# Patient Record
Sex: FEMALE | Race: WHITE | HISPANIC OR LATINO | Employment: UNEMPLOYED | URBAN - METROPOLITAN AREA
[De-identification: names, ages, dates, MRNs, and addresses within clinical notes are randomized per-mention and may not be internally consistent; named-entity substitution may affect disease eponyms.]

---

## 2017-02-14 RX ORDER — OLOPATADINE HYDROCHLORIDE 1 MG/ML
SOLUTION/ DROPS OPHTHALMIC
Qty: 20 ML | Refills: 7 | Status: SHIPPED | OUTPATIENT
Start: 2017-02-14 | End: 2017-04-17 | Stop reason: SDUPTHER

## 2017-02-28 ENCOUNTER — HOSPITAL ENCOUNTER (EMERGENCY)
Facility: HOSPITAL | Age: 47
Discharge: HOME | End: 2017-03-01
Attending: EMERGENCY MEDICINE
Payer: OTHER GOVERNMENT

## 2017-02-28 DIAGNOSIS — R00.0 TACHYCARDIA, UNSPECIFIED: ICD-10-CM

## 2017-02-28 DIAGNOSIS — R07.9 CHEST PAIN: ICD-10-CM

## 2017-02-28 DIAGNOSIS — J18.9 PNEUMONIA OF RIGHT MIDDLE LOBE DUE TO INFECTIOUS ORGANISM: Primary | ICD-10-CM

## 2017-02-28 LAB
ALBUMIN SERPL BCP-MCNC: 3.5 G/DL
ALP SERPL-CCNC: 63 U/L
ALT SERPL W/O P-5'-P-CCNC: 9 U/L
ANION GAP SERPL CALC-SCNC: 10 MMOL/L
AST SERPL-CCNC: 20 U/L
B-HCG UR QL: NEGATIVE
BACTERIA #/AREA URNS HPF: NORMAL /HPF
BASOPHILS # BLD AUTO: 0.01 K/UL
BASOPHILS NFR BLD: 0.1 %
BILIRUB SERPL-MCNC: 0.9 MG/DL
BILIRUB UR QL STRIP: NEGATIVE
BUN SERPL-MCNC: 8 MG/DL
CALCIUM SERPL-MCNC: 9.1 MG/DL
CHLORIDE SERPL-SCNC: 104 MMOL/L
CLARITY UR: CLEAR
CO2 SERPL-SCNC: 22 MMOL/L
COLOR UR: ABNORMAL
CREAT SERPL-MCNC: 0.8 MG/DL
CTP QC/QA: YES
DIFFERENTIAL METHOD: ABNORMAL
EOSINOPHIL # BLD AUTO: 0 K/UL
EOSINOPHIL NFR BLD: 0.1 %
ERYTHROCYTE [DISTWIDTH] IN BLOOD BY AUTOMATED COUNT: 12.3 %
EST. GFR  (AFRICAN AMERICAN): >60 ML/MIN/1.73 M^2
EST. GFR  (NON AFRICAN AMERICAN): >60 ML/MIN/1.73 M^2
FLUAV AG SPEC QL IA: NEGATIVE
FLUBV AG SPEC QL IA: NEGATIVE
GLUCOSE SERPL-MCNC: 100 MG/DL
GLUCOSE UR QL STRIP: NEGATIVE
HCT VFR BLD AUTO: 39.2 %
HGB BLD-MCNC: 13.4 G/DL
HGB UR QL STRIP: ABNORMAL
KETONES UR QL STRIP: ABNORMAL
LEUKOCYTE ESTERASE UR QL STRIP: NEGATIVE
LYMPHOCYTES # BLD AUTO: 0.8 K/UL
LYMPHOCYTES NFR BLD: 6.8 %
MCH RBC QN AUTO: 29.8 PG
MCHC RBC AUTO-ENTMCNC: 34.2 %
MCV RBC AUTO: 87 FL
MICROSCOPIC COMMENT: NORMAL
MONOCYTES # BLD AUTO: 0.9 K/UL
MONOCYTES NFR BLD: 7.2 %
NEUTROPHILS # BLD AUTO: 10.6 K/UL
NEUTROPHILS NFR BLD: 85.8 %
NITRITE UR QL STRIP: NEGATIVE
PH UR STRIP: 6 [PH] (ref 5–8)
PLATELET # BLD AUTO: 205 K/UL
PMV BLD AUTO: 9.9 FL
POTASSIUM SERPL-SCNC: 3.7 MMOL/L
PROT SERPL-MCNC: 8.8 G/DL
PROT UR QL STRIP: NEGATIVE
RBC # BLD AUTO: 4.5 M/UL
RBC #/AREA URNS HPF: 1 /HPF (ref 0–4)
SODIUM SERPL-SCNC: 136 MMOL/L
SP GR UR STRIP: 1 (ref 1–1.03)
SPECIMEN SOURCE: NORMAL
URN SPEC COLLECT METH UR: ABNORMAL
UROBILINOGEN UR STRIP-ACNC: NEGATIVE EU/DL
WBC # BLD AUTO: 12.37 K/UL

## 2017-02-28 PROCEDURE — 80053 COMPREHEN METABOLIC PANEL: CPT

## 2017-02-28 PROCEDURE — 87400 INFLUENZA A/B EACH AG IA: CPT | Mod: 59

## 2017-02-28 PROCEDURE — 25000003 PHARM REV CODE 250: Performed by: EMERGENCY MEDICINE

## 2017-02-28 PROCEDURE — 99284 EMERGENCY DEPT VISIT MOD MDM: CPT | Mod: 25

## 2017-02-28 PROCEDURE — 96360 HYDRATION IV INFUSION INIT: CPT | Mod: 59

## 2017-02-28 PROCEDURE — 93005 ELECTROCARDIOGRAM TRACING: CPT

## 2017-02-28 PROCEDURE — 81000 URINALYSIS NONAUTO W/SCOPE: CPT

## 2017-02-28 PROCEDURE — 81025 URINE PREGNANCY TEST: CPT | Performed by: EMERGENCY MEDICINE

## 2017-02-28 PROCEDURE — 85025 COMPLETE CBC W/AUTO DIFF WBC: CPT

## 2017-02-28 PROCEDURE — 96361 HYDRATE IV INFUSION ADD-ON: CPT

## 2017-02-28 PROCEDURE — 84484 ASSAY OF TROPONIN QUANT: CPT

## 2017-02-28 RX ORDER — ACETAMINOPHEN 325 MG/1
650 TABLET ORAL
Status: COMPLETED | OUTPATIENT
Start: 2017-02-28 | End: 2017-02-28

## 2017-02-28 RX ADMIN — SODIUM CHLORIDE 1000 ML: 0.9 INJECTION, SOLUTION INTRAVENOUS at 10:02

## 2017-02-28 RX ADMIN — ACETAMINOPHEN 650 MG: 325 TABLET ORAL at 10:02

## 2017-02-28 RX ADMIN — IOHEXOL 70 ML: 350 INJECTION, SOLUTION INTRAVENOUS at 11:02

## 2017-02-28 NOTE — ED AVS SNAPSHOT
[facility]  [address] [place] [address]               Silvia Newsome   2017 10:10 PM   ED    Description:  Female : 1970   Department:  [facility]           Your Care was Coordinated By:     Provider Role From To    Veronica Murray MD Attending Provider 17 2222 --      Reason for Visit     Chest Pain           Diagnoses this Visit        Comments    Pneumonia of right middle lobe due to infectious organism    -  Primary     Chest pain         Tachycardia, unspecified           ED Disposition     ED Disposition Condition Comment    Discharge             To Do List           Follow-up Information     Follow up with Chun Mcdonald MD. Schedule an appointment as soon as possible for a visit in 2 days.    Specialty:  Allergy    Contact information:    Nidia KOV[address]  [facility] [address]  [phone]         These Medications        Disp Refills Start End    azithromycin (ZITHROMAX Z-CATIE) 250 MG tablet 10 tablet 0 3/1/2017 3/11/2017    Take 1 tablet (250 mg total) by mouth once daily. - Oral    Pharmacy: [facility] #[id number] - [place] Chavez - [address].  #: [phone]         [facility]     [facility] -  Assistance  Registered nurses in the [facility] provide clinical advisement, health education, appointment booking, and other advisory services.  Call for this free service at [phone].             Medications           Message regarding Medications     Verify the changes and/or additions to your medication regime listed below are the same as discussed with your clinician today.  If any of these changes or additions are incorrect, please notify your healthcare provider.        START taking these NEW medications        Refills    azithromycin (ZITHROMAX Z-CATIE) 250 MG tablet 0    Sig: Take 1 tablet (250 mg total) by mouth once daily.    Class: Print    Route: Oral       These medications were administered today        Dose Freq    sodium chloride 0.9% bolus 1,000 mL 1,000 mL ED 1 Time    Sig: Inject 1,000 mLs into the vein ED 1 Time.    Class: Normal    Route: Intravenous    acetaminophen tablet 650 mg 650 mg ED 1 Time    Sig: Take 2 tablets (650 mg total) by mouth ED 1 Time.    Class: Normal    Route: Oral    omnipaque 350 iohexol 70 mL 70 mL IMG once as needed    Starting on: 3/1/2017    Sig: Inject 70 mLs into the vein ONCE PRN for contrast.    Class: Normal    Route: Intravenous    sodium chloride 0.9% bolus 1,000 mL 1,000 mL ED 1 Time    Starting on: 3/1/2017    Sig: Inject 1,000 mLs into the vein ED 1 Time.    Class: Normal    Route: Intravenous    azithromycin tablet 500 mg 500 mg ED 1 Time    Sig: Take 2 tablets (500 mg total) by mouth ED 1 Time.    Class: Normal    Route: Oral           Verify that the below list of medications is an accurate representation of the medications you are currently taking.  If none reported, the list may be blank. If incorrect, please contact your healthcare provider. Carry this list with you in case of emergency.           Current Medications     augmented betamethasone dipropionate (DIPROLENE-AF) 0.05 % ointment Apply topically 2 (two) times daily.    azithromycin (ZITHROMAX Z-CATIE) 250 MG tablet Take 1 tablet (250 mg total) by mouth once daily.    azithromycin tablet 500 mg Take 2 tablets (500 mg total) by mouth ED 1 Time.    diphenhydrAMINE (SOMINEX) 25 mg tablet Take 25 mg by mouth nightly as needed for Insomnia.    fluticasone (FLONASE) 50 mcg/actuation nasal spray 2 sprays by Each Nare route once daily.    olopatadine (PATANOL) 0.1 % ophthalmic solution INSTILL 1 DROP IN BOTH EYES TWICE DAILY AS NEEDED           Clinical Reference Information           Your Vitals Were     BP                   96/59           Allergies as of 3/1/2017        Reactions    Shellfish Containing Products       Immunizations Administered on Date of  Encounter - 3/1/2017     None      ED Micro, Lab, POCT     Start Ordered       Status Ordering Provider    03/01/17 0026 03/01/17 0025  Troponin I  STAT      In process     02/28/17 2322 02/28/17 2321  Troponin I  Add-on      Completed     02/28/17 2231 02/28/17 2230  Urinalysis  STAT      Final result     02/28/17 2230 02/28/17 2230  Urinalysis Microscopic  Once      Final result     02/28/17 2224 02/28/17 2223  Influenza antigen Nasopharyngeal Swab  STAT      Final result     02/28/17 2224 02/28/17 2223  POCT urine pregnancy  Once      Final result     02/28/17 2223 02/28/17 2223  CBC auto differential  STAT      Final result     02/28/17 2223 02/28/17 2223  Comprehensive metabolic panel  STAT      Final result     02/28/17 2223 02/28/17 2223  Troponin I  Once      Final result       ED Imaging Orders     Start Ordered       Status Ordering Provider    02/28/17 2332 02/28/17 2331  CTA Chest Non-Coronary - PE Study  1 time imaging      Final result     02/28/17 2224 02/28/17 2223  X-Ray Chest 1 View  1 time imaging      Final result         Discharge Instructions           Pneumonia (Adult)  Pneumonia is an infection deep within the lungs. It is in the small air sacs (alveoli). Pneumonia may be caused by a virus or bacteria. Pneumonia caused by bacteria is usually treated with an antibiotic. Severe cases may need to be treated in the hospital. Milder cases can be treated at home. Symptoms usually start to get better during the first 2 days of treatment.    Home care  Follow these guidelines when caring for yourself at home:  · Rest at home for the first 2 to 3 days, or until you feel stronger. Dont let yourself get overly tired when you go back to your activities.  · Stay away from cigarette smoke - yours or other peoples.  · You may use acetaminophen or ibuprofen to control fever or pain, unless another medicine was prescribed. If you have chronic liver or kidney disease, talk with your health care provider  before using these medicines. Also talk with your provider if youve had a stomach ulcer or GI bleeding. Dont give aspirin to anyone younger than 18 years of age who is ill with a fever. It may cause severe liver damage.  · Your appetite may be poor, so a light diet is fine.  · Drink 6 to 8 glasses of fluids every day to make sure you are getting enough fluids. Beverages can include water, sport drinks, sodas without caffeine, juices, tea, or soup. Fluids will help loosen secretions in the lung. This will make it easier for you to cough up the phlegm (sputum). If you also have heart or kidney disease, check with your health care provider before you drink extra fluids.  · Take antibiotic medicine prescribed until it is all gone, even if you are feeling better after a few days.  Follow-up care  Follow up with your health care provider in the next 2 to 3 days, or as advised. This is to be sure the medicine is helping you get better.  If you are 65 or older, you should get a pneumococcal vaccine and a yearly flu (influenza) shot. You should also get these vaccines if you have chronic lung disease like asthma, emphysema, or COPD. Ask your provider about this.  When to seek medical advice  Call your health care provider right away if any of these occur:  · You dont get better within the first 48 hours of treatment  · Shortness of breath gets worse  · Rapid breathing (more than 25 breaths per minute)  · Coughing up blood  · Chest pain gets worse with breathing  · Fever of 102°F (38°C) or higher that doesnt get better with fever medicine  · Weakness, dizziness, or fainting that gets worse  · Thirst or dry mouth that gets worse  · Sinus pain, headache, or a stiff neck  · Chest pain not caused by coughing  Date Last Reviewed: 12/23/2014  © 0759-5665 [employer]. [address] [place] [address], [place] [address]. All rights reserved. This information is not intended as a substitute for professional medical care.  Always follow your healthcare professional's instructions.      Your CT Scan showed an incidental findings of a vascular abnormality to the left lung. This is not currently an emergency and not related to your symptoms but should be followed by your Primary provider or Cardiothoracic Surgeon.      [facility] complies with applicable Federal civil rights laws and does not discriminate on the basis of race, color, national origin, age, disability, or sex.        Language Assistance Services     ATTENTION: Language assistance services are available, free of charge. Please call [phone].      ATENCIÓN: Si habla español, tiene a sanchez disposición servicios gratuitos de asistencia lingüística. Llame al [phone].     VALERIA Ý: N?u b?n nói Ti?ng Vi?t, có các d?ch v? h? tr? ngôn ng? mi?n phí dành cho b?n. G?i s? [phone].

## 2017-03-01 VITALS
DIASTOLIC BLOOD PRESSURE: 74 MMHG | BODY MASS INDEX: 19.49 KG/M2 | RESPIRATION RATE: 18 BRPM | TEMPERATURE: 98 F | WEIGHT: 110 LBS | SYSTOLIC BLOOD PRESSURE: 112 MMHG | HEIGHT: 63 IN | OXYGEN SATURATION: 100 % | HEART RATE: 94 BPM

## 2017-03-01 LAB — TROPONIN I SERPL DL<=0.01 NG/ML-MCNC: <0.006 NG/ML

## 2017-03-01 PROCEDURE — 25500020 PHARM REV CODE 255: Performed by: EMERGENCY MEDICINE

## 2017-03-01 PROCEDURE — 25000003 PHARM REV CODE 250: Performed by: EMERGENCY MEDICINE

## 2017-03-01 RX ORDER — AZITHROMYCIN 250 MG/1
500 TABLET, FILM COATED ORAL
Status: COMPLETED | OUTPATIENT
Start: 2017-03-01 | End: 2017-03-01

## 2017-03-01 RX ORDER — AZITHROMYCIN 250 MG/1
250 TABLET, FILM COATED ORAL DAILY
Qty: 10 TABLET | Refills: 0 | Status: SHIPPED | OUTPATIENT
Start: 2017-03-01 | End: 2017-03-11

## 2017-03-01 RX ADMIN — SODIUM CHLORIDE 1000 ML: 0.9 INJECTION, SOLUTION INTRAVENOUS at 12:03

## 2017-03-01 RX ADMIN — AZITHROMYCIN 500 MG: 250 TABLET, FILM COATED ORAL at 01:03

## 2017-03-01 NOTE — DISCHARGE INSTRUCTIONS
Pneumonia (Adult)  Pneumonia is an infection deep within the lungs. It is in the small air sacs (alveoli). Pneumonia may be caused by a virus or bacteria. Pneumonia caused by bacteria is usually treated with an antibiotic. Severe cases may need to be treated in the hospital. Milder cases can be treated at home. Symptoms usually start to get better during the first 2 days of treatment.    Home care  Follow these guidelines when caring for yourself at home:  · Rest at home for the first 2 to 3 days, or until you feel stronger. Dont let yourself get overly tired when you go back to your activities.  · Stay away from cigarette smoke - yours or other peoples.  · You may use acetaminophen or ibuprofen to control fever or pain, unless another medicine was prescribed. If you have chronic liver or kidney disease, talk with your health care provider before using these medicines. Also talk with your provider if youve had a stomach ulcer or GI bleeding. Dont give aspirin to anyone younger than 18 years of age who is ill with a fever. It may cause severe liver damage.  · Your appetite may be poor, so a light diet is fine.  · Drink 6 to 8 glasses of fluids every day to make sure you are getting enough fluids. Beverages can include water, sport drinks, sodas without caffeine, juices, tea, or soup. Fluids will help loosen secretions in the lung. This will make it easier for you to cough up the phlegm (sputum). If you also have heart or kidney disease, check with your health care provider before you drink extra fluids.  · Take antibiotic medicine prescribed until it is all gone, even if you are feeling better after a few days.  Follow-up care  Follow up with your health care provider in the next 2 to 3 days, or as advised. This is to be sure the medicine is helping you get better.  If you are 65 or older, you should get a pneumococcal vaccine and a yearly flu (influenza) shot. You should also get these vaccines if you have  chronic lung disease like asthma, emphysema, or COPD. Ask your provider about this.  When to seek medical advice  Call your health care provider right away if any of these occur:  · You dont get better within the first 48 hours of treatment  · Shortness of breath gets worse  · Rapid breathing (more than 25 breaths per minute)  · Coughing up blood  · Chest pain gets worse with breathing  · Fever of 102°F (38°C) or higher that doesnt get better with fever medicine  · Weakness, dizziness, or fainting that gets worse  · Thirst or dry mouth that gets worse  · Sinus pain, headache, or a stiff neck  · Chest pain not caused by coughing  Date Last Reviewed: 12/23/2014  © 8693-6098 "[employer]. [address] [place] [address], [place] [address]. All rights reserved. This information is not intended as a substitute for professional medical care. Always follow your healthcare professional's instructions.      Your CT Scan showed an incidental findings of a vascular abnormality to the left lung. This is not currently an emergency and not related to your symptoms but should be followed by your Primary provider or Cardiothoracic Surgeon.

## 2017-03-01 NOTE — ED PROVIDER NOTES
"Encounter Date: 2/28/2017    SCRIBE #1 NOTE: I, Margarita Hsieh, am scribing for, and in the presence of,  Veronica Murray MD. I have scribed the following portions of the note - Other sections scribed: HPI, ROS.       History     Chief Complaint   Patient presents with    Chest Pain     " She is having pain in her right chest and right shoulder that hurts when she inhales Today she was running a fever of 102.5 less than an hour ago. +n/v      Review of patient's allergies indicates:   Allergen Reactions    Shellfish containing products      HPI Comments: CC: Chest Pain    HPI: 46 year old female with no known PMHx presents to the ED c/o acute, severe (8/10) right sided chest pain which radiates to her right shoulder since this morning. Patient states associated intermittent fever, sore throat, nausea, and decreased appetite.  reports treating with 2 Advil this morning, 2 Excedrin this morning, and Benadryl at 1900. Patient notes she did not receive the flu shot this year. No sick contact. Patient otherwise denies a hx of blood clots, dysuria, back pain and other symptoms.          The history is provided by the patient and the spouse. No  was used.     History reviewed. No pertinent past medical history.  History reviewed. No pertinent surgical history.  Family History   Problem Relation Age of Onset    Breast cancer Neg Hx     Colon cancer Neg Hx     Ovarian cancer Neg Hx      Social History   Substance Use Topics    Smoking status: Never Smoker    Smokeless tobacco: None    Alcohol use No     Review of Systems   Constitutional: Positive for appetite change (decreased) and fever.   HENT: Positive for sore throat. Negative for rhinorrhea.    Eyes: Negative for pain.   Respiratory: Negative for shortness of breath.    Cardiovascular: Positive for chest pain (right sided).   Gastrointestinal: Positive for nausea.   Genitourinary: Negative for dysuria.   Musculoskeletal: Negative " for back pain.        (+) Shoulder Pain (right)   Skin: Negative for rash.   Neurological: Negative for headaches.       Physical Exam   Initial Vitals   BP Pulse Resp Temp SpO2   02/28/17 2208 02/28/17 2208 02/28/17 2208 02/28/17 2208 02/28/17 2208   112/81 126 20 100.1 °F (37.8 °C) 99 %     Physical Exam    Nursing note and vitals reviewed.  Constitutional: She appears well-developed and well-nourished. She is cooperative.  Non-toxic appearance. No distress.   HENT:   Head: Normocephalic and atraumatic.   Mouth/Throat: Oropharynx is clear and moist.   Eyes: Conjunctivae and EOM are normal. Pupils are equal, round, and reactive to light.   Neck: Normal range of motion and full passive range of motion without pain. Neck supple. No thyromegaly present. No JVD present.   Cardiovascular: Regular rhythm, normal heart sounds and normal pulses. Tachycardia present.    Pulmonary/Chest: Effort normal and breath sounds normal. No respiratory distress.   Abdominal: Soft. Normal appearance and bowel sounds are normal. She exhibits no distension and no mass. There is no tenderness. There is no CVA tenderness.   Musculoskeletal: Normal range of motion.   Neurological: She is alert and oriented to person, place, and time. She has normal strength. No cranial nerve deficit or sensory deficit.   Skin: Skin is warm, dry and intact. No rash noted.   Psychiatric: She has a normal mood and affect. Her speech is normal and behavior is normal. Judgment and thought content normal.         ED Course   Procedures  Labs Reviewed   CBC W/ AUTO DIFFERENTIAL - Abnormal; Notable for the following:        Result Value    Gran # 10.6 (*)     Lymph # 0.8 (*)     Gran% 85.8 (*)     Lymph% 6.8 (*)     All other components within normal limits   COMPREHENSIVE METABOLIC PANEL - Abnormal; Notable for the following:     CO2 22 (*)     Total Protein 8.8 (*)     ALT 9 (*)     All other components within normal limits   URINALYSIS - Abnormal; Notable for the  following:     Ketones, UA Trace (*)     Occult Blood UA 1+ (*)     All other components within normal limits   INFLUENZA A AND B ANTIGEN   URINALYSIS MICROSCOPIC   TROPONIN I   TROPONIN I   TROPONIN I   POCT URINE PREGNANCY     EKG Readings: (Independently Interpreted)   Initial Reading: No STEMI.   Sinus tachycardia, rate 115, right axis deviation, there able baseline, Q waves V1 and V2, T-wave inversion to 3 aVF, V3, V4 V5, no priors for comparison       X-Rays:   Independently Interpreted Readings:   Chest X-Ray: Normal heart size. Possible right-sided infiltrate, no pneumothorax, no cardiomegaly     Medical Decision Making:   Initial Assessment:   Urgent evaluation a 38-year-old female presenting with 24 hours of intermittent fever, right-sided chest and back discomfort, chills, and sore throat.  Patient endorses decreased appetite, no relief with Advil, Excedrin, or Benadryl.  Patient did not receive influenza vaccine this year.  Patient denies history of DVT or PE, no immobilization, or extremity swelling.  On exam patient has low-grade temperature, tachycardic, not tachypneic, no hypoxia.  I suspect viral syndrome, influenza, pneumonia, less likely pericarditis, low suspicion for PE.  We'll administer antipyretics, IV fluids, and reassess.  Clinical Tests:   Lab Tests: Ordered and Reviewed  Radiological Study: Ordered and Reviewed  Medical Tests: Ordered and Reviewed  ED Management:  Patient's tachycardia improved, however questionable haziness to the right middle lobe, we'll obtain a CTA rule out PE or interstitial disease.  Patient not pregnant, no anemia    1:02 AM  Discussed with the patient findings of right middle lobe pneumonia as well as incidental findings for sided arteriovenous malformation and need for follow-up.  Patient given 1 dose antibiotics prior to discharge home, tachycardia improved after 2 L IV fluid bolus.  Discharge home with fever precautions and strict follow-up with primary care  provider within 24-48 hours or return to the emergency Department with worsening shortness of breath or any other medical concerns.            Scribe Attestation:   Scribe #1: I performed the above scribed service and the documentation accurately describes the services I performed. I attest to the accuracy of the note.    Attending Attestation:           Physician Attestation for Scribe:  Physician Attestation Statement for Scribe #1: I, Veronica Murray MD, reviewed documentation, as scribed by Margarita Hsieh in my presence, and it is both accurate and complete.                 ED Course     Clinical Impression:       Disposition:   Disposition: Discharged  Condition: Fair     1. Pneumonia of right middle lobe due to infectious organism    2. Chest pain    3. Tachycardia, unspecified           Veronica Murray MD  03/01/17 0104

## 2017-03-01 NOTE — ED TRIAGE NOTES
Pt. Presents to ED with c/o generalized body aches, fever and cough x a few days, c/o nausea and vomiting rates pain 7/10.

## 2017-04-17 RX ORDER — OLOPATADINE HYDROCHLORIDE 1 MG/ML
SOLUTION/ DROPS OPHTHALMIC
Qty: 5 ML | Refills: 0 | Status: SHIPPED | OUTPATIENT
Start: 2017-04-17 | End: 2019-03-13 | Stop reason: SDUPTHER

## 2017-04-18 NOTE — TELEPHONE ENCOUNTER
----- Message from Bhargavi Doe MA sent at 4/18/2017 11:56 AM CDT -----  Contact: [facility] [phone]  Pharmacy would like to speak with someone regarding a Rx clarification for olopatadine (PATANOL) 0.1 % ophthalmic solution. Please advise.    Thanks

## 2017-04-18 NOTE — TELEPHONE ENCOUNTER
Informed pharmacist that rx is good for only 5 ml. Patient needs follow up appointment for any further refills.

## 2019-03-13 RX ORDER — OLOPATADINE HYDROCHLORIDE 1 MG/ML
SOLUTION/ DROPS OPHTHALMIC
Qty: 5 ML | Refills: 0 | Status: SHIPPED | OUTPATIENT
Start: 2019-03-13 | End: 2019-08-16 | Stop reason: SDUPTHER

## 2019-05-31 RX ORDER — FLUTICASONE PROPIONATE 50 MCG
SPRAY, SUSPENSION (ML) NASAL
Qty: 16 ML | Refills: 0 | OUTPATIENT
Start: 2019-05-31

## 2019-08-16 ENCOUNTER — OFFICE VISIT (OUTPATIENT)
Dept: ALLERGY | Facility: CLINIC | Age: 49
End: 2019-08-16
Payer: OTHER GOVERNMENT

## 2019-08-16 VITALS — HEIGHT: 61 IN | BODY MASS INDEX: 20.82 KG/M2 | WEIGHT: 110.25 LBS

## 2019-08-16 DIAGNOSIS — R09.82 POST-NASAL DRIP: ICD-10-CM

## 2019-08-16 DIAGNOSIS — H10.45 OTHER CHRONIC ALLERGIC CONJUNCTIVITIS OF BOTH EYES: Chronic | ICD-10-CM

## 2019-08-16 DIAGNOSIS — J31.0 RHINITIS, CHRONIC: Primary | ICD-10-CM

## 2019-08-16 PROCEDURE — 99213 OFFICE O/P EST LOW 20 MIN: CPT | Mod: PBBFAC,PO | Performed by: STUDENT IN AN ORGANIZED HEALTH CARE EDUCATION/TRAINING PROGRAM

## 2019-08-16 PROCEDURE — 99204 OFFICE O/P NEW MOD 45 MIN: CPT | Mod: S$PBB,,, | Performed by: STUDENT IN AN ORGANIZED HEALTH CARE EDUCATION/TRAINING PROGRAM

## 2019-08-16 PROCEDURE — 99999 PR PBB SHADOW E&M-EST. PATIENT-LVL III: ICD-10-PCS | Mod: PBBFAC,,, | Performed by: STUDENT IN AN ORGANIZED HEALTH CARE EDUCATION/TRAINING PROGRAM

## 2019-08-16 PROCEDURE — 99999 PR PBB SHADOW E&M-EST. PATIENT-LVL III: CPT | Mod: PBBFAC,,, | Performed by: STUDENT IN AN ORGANIZED HEALTH CARE EDUCATION/TRAINING PROGRAM

## 2019-08-16 PROCEDURE — 99204 PR OFFICE/OUTPT VISIT, NEW, LEVL IV, 45-59 MIN: ICD-10-PCS | Mod: S$PBB,,, | Performed by: STUDENT IN AN ORGANIZED HEALTH CARE EDUCATION/TRAINING PROGRAM

## 2019-08-16 RX ORDER — DIPHENHYDRAMINE HCL 25 MG
25 TABLET ORAL NIGHTLY PRN
COMMUNITY
Start: 2019-08-16

## 2019-08-16 RX ORDER — OLOPATADINE HYDROCHLORIDE 1 MG/ML
SOLUTION/ DROPS OPHTHALMIC
Qty: 15 ML | Refills: 11 | Status: SHIPPED | OUTPATIENT
Start: 2019-08-16 | End: 2020-03-03

## 2019-08-16 RX ORDER — AZELASTINE 1 MG/ML
2 SPRAY, METERED NASAL 2 TIMES DAILY PRN
Qty: 30 ML | Refills: 11 | Status: SHIPPED | OUTPATIENT
Start: 2019-08-16 | End: 2022-01-14 | Stop reason: SDUPTHER

## 2019-08-16 RX ORDER — FLUTICASONE PROPIONATE 50 MCG
2 SPRAY, SUSPENSION (ML) NASAL DAILY
Qty: 1 BOTTLE | Refills: 11 | Status: SHIPPED | OUTPATIENT
Start: 2019-08-16 | End: 2019-09-09 | Stop reason: SDUPTHER

## 2019-08-16 RX ORDER — OLOPATADINE HYDROCHLORIDE 1 MG/ML
SOLUTION/ DROPS OPHTHALMIC
Qty: 5 ML | Refills: 0 | Status: SHIPPED | OUTPATIENT
Start: 2019-08-16 | End: 2019-08-16 | Stop reason: SDUPTHER

## 2019-08-16 NOTE — PROGRESS NOTES
Allergy Clinic Note  [facility]    Subjective:      Patient ID: Silvia Newsome is a 49 y.o. female.    Chief Complaint: Other (red and itchy eyes in Jan and Feb) and Nasal Congestion      Referring Provider: Self, Aaareferral    History of Present Illness:  49-year-old female is here with her  requesting refills of nose spray and eye drops.  Her   dominated the conversation, rarely allowing her to speak.    Chief complaints are itchy eyes and nasal congestion.  Also complains of cough which she is unable to localize.  All symptoms except cough her adequately controlled on Flonase nasal spray and all of padded in eyedrops, according to her .  She is currently out of nose spray and is experiencing an increase in nasal congestion.    Patient was seen in our section by Dr. Bronson Mcdonald in 2014 and evidently by me in private practice prior to 2013.    Reported delayed onset of itching and gentle area after eating large amount of shrimp.        Additional history:  Past medical history is significant for an isolated episode of pneumonia.  No history of surgeries.    Patient Active Problem List   Diagnosis    Pneumonia of right middle lobe due to infectious organism     Current Outpatient Medications on File Prior to Visit   Medication Sig Dispense Refill    [DISCONTINUED] diphenhydrAMINE (SOMINEX) 25 mg tablet Take 25 mg by mouth nightly as needed for Insomnia.      [DISCONTINUED] fluticasone (FLONASE) 50 mcg/actuation nasal spray 2 sprays by Each Nare route once daily. 1 Bottle 11    [DISCONTINUED] olopatadine (PATANOL) 0.1 % ophthalmic solution INSTILL 1 DROP IN BOTH EYES TWICE DAILY AS NEEDED 5 mL 0    augmented betamethasone dipropionate (DIPROLENE-AF) 0.05 % ointment Apply topically 2 (two) times daily. 45 g 2    naproxen (NAPROSYN) 500 MG tablet       [DISCONTINUED] clobetasol 0.05% (TEMOVATE) 0.05 % Oint Use twice daily for one week 30 g 1    [DISCONTINUED] estradiol  "(ESTRACE) 0.01 % (0.1 mg/gram) vaginal cream Place 0.5 g vaginally every other day. 42.5 g 5    [DISCONTINUED] triamcinolone acetonide 0.1% (KENALOG) 0.1 % cream Apply topically 2 (two) times daily as needed. 45 g 1     No current facility-administered medications on file prior to visit.          ROS    Objective:   Ht 5' 1" (1.549 m)   Wt 50 kg (110 lb 3.7 oz)   BMI 20.83 kg/m²     Physical Exam   Constitutional: She is well-developed, well-nourished, and in no distress.   HENT:   Head: Normocephalic and atraumatic.   Nose: Nose normal.   Mouth/Throat: No oropharyngeal exudate.   TMs clear.  Nares pink with moderate turbinates swelling bilaterally.   Eyes: Conjunctivae are normal. Right eye exhibits no discharge. Left eye exhibits no discharge.   Neck: Neck supple.   Cardiovascular: Normal rate, regular rhythm, normal heart sounds and intact distal pulses.   Pulmonary/Chest: Effort normal and breath sounds normal. No stridor. No respiratory distress. She has no wheezes.   Abdominal: She exhibits no distension.   Musculoskeletal: She exhibits no edema or deformity.   Lymphadenopathy:     She has no cervical adenopathy.   Neurological: She is alert. GCS score is 15.   Skin: No rash noted. No erythema.   Psychiatric: Affect normal.   Apt was rushed and limited by 's breaking in when cient was speaking.       Data:   Reviewed Dr. Mcdonald does note from 2014    Previous skin testing results from around 2013 are not available.    Assessment:     1. Rhinitis, chronic    2. Post-nasal drip    3. Chronic allergic conjunctivitis        Plan:     Silvia was seen today for other and nasal congestion.    Diagnoses and all orders for this visit:    Rhinitis, chronic manifest by Post-nasal drip  Chronic allergic conjunctivitis  -     azelastine (ASTELIN) 137 mcg (0.1 %) nasal spray; 2 sprays (274 mcg total) by Nasal route 2 (two) times daily as needed for Rhinitis. Donot snort (because it tastes bad)  -     " fluticasone propionate (FLONASE) 50 mcg/actuation nasal spray; 2 sprays (100 mcg total) by Each Nostril route once daily.  -     olopatadine (PATANOL) 0.1 % ophthalmic solution; INSTILL 1 DROP IN BOTH EYES TWICE DAILY AS NEEDED  -     diphenhydrAMINE (SOMINEX) 25 mg tablet; Take 1 tablet (25 mg total) by mouth nightly as needed for Insomnia.        Patient Instructions       Resume Flonase (= fluticasone) nasal spray:  2 squirts each nostril daily   Remember to aim out toward your ear.   Needs to be used regularly 5-14 days for full effect.    Olopatadine eye drops as needed, up to twice a day      Astelin = azelastine nasal spray if needed for cough.   2 squirts each nostril as needed, up to twice a day   Do not snort (because it burns and tastes bad)    Return as needed              Follow up if symptoms worsen or fail to improve.    Jaja Davidson MD  coord care >50% of visit.

## 2019-08-16 NOTE — PATIENT INSTRUCTIONS
Resume Flonase (= fluticasone) nasal spray:  2 squirts each nostril daily   Remember to aim out toward your ear.   Needs to be used regularly 5-14 days for full effect.    Olopatadine eye drops as needed, up to twice a day      Astelin = azelastine nasal spray if needed for cough.   2 squirts each nostril as needed, up to twice a day   Do not snort (because it burns and tastes bad)    Return as needed

## 2019-09-09 DIAGNOSIS — J31.0 RHINITIS, CHRONIC: Primary | ICD-10-CM

## 2019-09-09 RX ORDER — FLUTICASONE PROPIONATE 50 MCG
2 SPRAY, SUSPENSION (ML) NASAL DAILY
Qty: 1 BOTTLE | Refills: 11 | Status: SHIPPED | OUTPATIENT
Start: 2019-09-09 | End: 2019-09-09 | Stop reason: SDUPTHER

## 2019-09-09 RX ORDER — FLUTICASONE PROPIONATE 50 MCG
2 SPRAY, SUSPENSION (ML) NASAL DAILY
Qty: 1 BOTTLE | Refills: 11 | Status: SHIPPED | OUTPATIENT
Start: 2019-09-09 | End: 2022-01-14 | Stop reason: SDUPTHER

## 2019-10-17 PROBLEM — J30.1 SEASONAL ALLERGIC RHINITIS DUE TO POLLEN: Status: ACTIVE | Noted: 2019-10-17

## 2020-03-01 DIAGNOSIS — H10.45 OTHER CHRONIC ALLERGIC CONJUNCTIVITIS OF BOTH EYES: Chronic | ICD-10-CM

## 2020-03-03 RX ORDER — OLOPATADINE HYDROCHLORIDE 1 MG/ML
SOLUTION/ DROPS OPHTHALMIC
Qty: 5 ML | Refills: 5 | Status: SHIPPED | OUTPATIENT
Start: 2020-03-03 | End: 2022-01-14 | Stop reason: SDUPTHER

## 2021-04-16 ENCOUNTER — PATIENT MESSAGE (OUTPATIENT)
Dept: RESEARCH | Facility: HOSPITAL | Age: 51
End: 2021-04-16

## 2021-09-22 ENCOUNTER — TELEPHONE (OUTPATIENT)
Dept: ALLERGY | Facility: CLINIC | Age: 51
End: 2021-09-22

## 2021-09-22 ENCOUNTER — PATIENT MESSAGE (OUTPATIENT)
Dept: ALLERGY | Facility: CLINIC | Age: 51
End: 2021-09-22

## 2021-10-04 ENCOUNTER — TELEPHONE (OUTPATIENT)
Dept: ALLERGY | Facility: CLINIC | Age: 51
End: 2021-10-04

## 2021-11-08 ENCOUNTER — HOSPITAL ENCOUNTER (EMERGENCY)
Facility: HOSPITAL | Age: 51
Discharge: HOME | End: 2021-11-08
Attending: EMERGENCY MEDICINE
Payer: OTHER GOVERNMENT

## 2021-11-08 VITALS
OXYGEN SATURATION: 99 % | BODY MASS INDEX: 21.6 KG/M2 | TEMPERATURE: 99 F | WEIGHT: 110 LBS | DIASTOLIC BLOOD PRESSURE: 64 MMHG | HEIGHT: 60 IN | HEART RATE: 69 BPM | SYSTOLIC BLOOD PRESSURE: 98 MMHG | RESPIRATION RATE: 23 BRPM

## 2021-11-08 DIAGNOSIS — R07.9 CHEST PAIN: ICD-10-CM

## 2021-11-08 DIAGNOSIS — S29.019A THORACIC MYOFASCIAL STRAIN, INITIAL ENCOUNTER: ICD-10-CM

## 2021-11-08 DIAGNOSIS — R07.89 CHEST PAIN, NON-CARDIAC: Primary | ICD-10-CM

## 2021-11-08 LAB
B-HCG UR QL: NEGATIVE
CTP QC/QA: YES

## 2021-11-08 PROCEDURE — 93005 ELECTROCARDIOGRAM TRACING: CPT

## 2021-11-08 PROCEDURE — 93010 ELECTROCARDIOGRAM REPORT: CPT | Mod: ,,, | Performed by: INTERNAL MEDICINE

## 2021-11-08 PROCEDURE — 63600175 PHARM REV CODE 636 W HCPCS: Performed by: EMERGENCY MEDICINE

## 2021-11-08 PROCEDURE — 81025 URINE PREGNANCY TEST: CPT | Performed by: PHYSICIAN ASSISTANT

## 2021-11-08 PROCEDURE — 93010 EKG 12-LEAD: ICD-10-PCS | Mod: ,,, | Performed by: INTERNAL MEDICINE

## 2021-11-08 PROCEDURE — 99284 EMERGENCY DEPT VISIT MOD MDM: CPT | Mod: 25

## 2021-11-08 RX ORDER — HYDROCODONE BITARTRATE AND ACETAMINOPHEN 5; 325 MG/1; MG/1
1 TABLET ORAL EVERY 4 HOURS PRN
Qty: 12 TABLET | Refills: 0 | Status: SHIPPED | OUTPATIENT
Start: 2021-11-08 | End: 2021-11-18

## 2021-11-08 RX ORDER — PREDNISONE 20 MG/1
60 TABLET ORAL
Status: COMPLETED | OUTPATIENT
Start: 2021-11-08 | End: 2021-11-08

## 2021-11-08 RX ORDER — PREDNISONE 20 MG/1
40 TABLET ORAL DAILY
Qty: 10 TABLET | Refills: 0 | Status: SHIPPED | OUTPATIENT
Start: 2021-11-08 | End: 2021-11-13

## 2021-11-08 RX ADMIN — PREDNISONE 60 MG: 20 TABLET ORAL at 04:11

## 2021-12-14 ENCOUNTER — TELEPHONE (OUTPATIENT)
Dept: ALLERGY | Facility: CLINIC | Age: 51
End: 2021-12-14
Payer: OTHER GOVERNMENT

## 2021-12-14 DIAGNOSIS — J31.0 RHINITIS, CHRONIC: ICD-10-CM

## 2021-12-14 RX ORDER — FLUTICASONE PROPIONATE 50 MCG
2 SPRAY, SUSPENSION (ML) NASAL DAILY
Status: CANCELLED | OUTPATIENT
Start: 2021-12-14

## 2021-12-15 RX ORDER — FLUTICASONE PROPIONATE 50 MCG
2 SPRAY, SUSPENSION (ML) NASAL DAILY
Qty: 1 ML | Refills: 1 | Status: CANCELLED | OUTPATIENT
Start: 2021-12-15

## 2022-01-14 ENCOUNTER — TELEPHONE (OUTPATIENT)
Dept: ALLERGY | Facility: CLINIC | Age: 52
End: 2022-01-14
Payer: OTHER GOVERNMENT

## 2022-01-14 ENCOUNTER — LAB VISIT (OUTPATIENT)
Dept: LAB | Facility: HOSPITAL | Age: 52
End: 2022-01-14
Attending: STUDENT IN AN ORGANIZED HEALTH CARE EDUCATION/TRAINING PROGRAM
Payer: OTHER GOVERNMENT

## 2022-01-14 ENCOUNTER — OFFICE VISIT (OUTPATIENT)
Dept: ALLERGY | Facility: CLINIC | Age: 52
End: 2022-01-14
Payer: OTHER GOVERNMENT

## 2022-01-14 VITALS — BODY MASS INDEX: 26.73 KG/M2 | HEIGHT: 57 IN | WEIGHT: 123.88 LBS

## 2022-01-14 DIAGNOSIS — R59.1 LYMPHADENOPATHY: ICD-10-CM

## 2022-01-14 DIAGNOSIS — J30.9 ALLERGIC RHINITIS, UNSPECIFIED SEASONALITY, UNSPECIFIED TRIGGER: Primary | ICD-10-CM

## 2022-01-14 DIAGNOSIS — H10.45 OTHER CHRONIC ALLERGIC CONJUNCTIVITIS OF BOTH EYES: Chronic | ICD-10-CM

## 2022-01-14 DIAGNOSIS — R09.82 POST-NASAL DRIP: ICD-10-CM

## 2022-01-14 LAB
BASOPHILS # BLD AUTO: 0.02 K/UL (ref 0–0.2)
BASOPHILS NFR BLD: 0.4 % (ref 0–1.9)
DIFFERENTIAL METHOD: NORMAL
EOSINOPHIL # BLD AUTO: 0.2 K/UL (ref 0–0.5)
EOSINOPHIL NFR BLD: 3 % (ref 0–8)
ERYTHROCYTE [DISTWIDTH] IN BLOOD BY AUTOMATED COUNT: 12 % (ref 11.5–14.5)
HCT VFR BLD AUTO: 39.7 % (ref 37–48.5)
HGB BLD-MCNC: 12.9 G/DL (ref 12–16)
IMM GRANULOCYTES # BLD AUTO: 0.01 K/UL (ref 0–0.04)
IMM GRANULOCYTES NFR BLD AUTO: 0.2 % (ref 0–0.5)
LYMPHOCYTES # BLD AUTO: 1.7 K/UL (ref 1–4.8)
LYMPHOCYTES NFR BLD: 33.3 % (ref 18–48)
MCH RBC QN AUTO: 29.1 PG (ref 27–31)
MCHC RBC AUTO-ENTMCNC: 32.5 G/DL (ref 32–36)
MCV RBC AUTO: 89 FL (ref 82–98)
MONOCYTES # BLD AUTO: 0.6 K/UL (ref 0.3–1)
MONOCYTES NFR BLD: 11.1 % (ref 4–15)
NEUTROPHILS # BLD AUTO: 2.6 K/UL (ref 1.8–7.7)
NEUTROPHILS NFR BLD: 52 % (ref 38–73)
NRBC BLD-RTO: 0 /100 WBC
PLATELET # BLD AUTO: 218 K/UL (ref 150–450)
PMV BLD AUTO: 10.9 FL (ref 9.2–12.9)
RBC # BLD AUTO: 4.44 M/UL (ref 4–5.4)
WBC # BLD AUTO: 5.04 K/UL (ref 3.9–12.7)

## 2022-01-14 PROCEDURE — 99213 OFFICE O/P EST LOW 20 MIN: CPT | Mod: PBBFAC,PO | Performed by: STUDENT IN AN ORGANIZED HEALTH CARE EDUCATION/TRAINING PROGRAM

## 2022-01-14 PROCEDURE — 99213 PR OFFICE/OUTPT VISIT, EST, LEVL III, 20-29 MIN: ICD-10-PCS | Mod: S$PBB,,, | Performed by: STUDENT IN AN ORGANIZED HEALTH CARE EDUCATION/TRAINING PROGRAM

## 2022-01-14 PROCEDURE — 36415 COLL VENOUS BLD VENIPUNCTURE: CPT | Mod: PO | Performed by: STUDENT IN AN ORGANIZED HEALTH CARE EDUCATION/TRAINING PROGRAM

## 2022-01-14 PROCEDURE — 99999 PR PBB SHADOW E&M-EST. PATIENT-LVL III: CPT | Mod: PBBFAC,,, | Performed by: STUDENT IN AN ORGANIZED HEALTH CARE EDUCATION/TRAINING PROGRAM

## 2022-01-14 PROCEDURE — 99999 PR PBB SHADOW E&M-EST. PATIENT-LVL III: ICD-10-PCS | Mod: PBBFAC,,, | Performed by: STUDENT IN AN ORGANIZED HEALTH CARE EDUCATION/TRAINING PROGRAM

## 2022-01-14 PROCEDURE — 80053 COMPREHEN METABOLIC PANEL: CPT | Performed by: STUDENT IN AN ORGANIZED HEALTH CARE EDUCATION/TRAINING PROGRAM

## 2022-01-14 PROCEDURE — 99213 OFFICE O/P EST LOW 20 MIN: CPT | Mod: S$PBB,,, | Performed by: STUDENT IN AN ORGANIZED HEALTH CARE EDUCATION/TRAINING PROGRAM

## 2022-01-14 PROCEDURE — 85025 COMPLETE CBC W/AUTO DIFF WBC: CPT | Performed by: STUDENT IN AN ORGANIZED HEALTH CARE EDUCATION/TRAINING PROGRAM

## 2022-01-14 RX ORDER — FLUTICASONE PROPIONATE 50 MCG
2 SPRAY, SUSPENSION (ML) NASAL DAILY
Qty: 16 G | Refills: 11 | Status: SHIPPED | OUTPATIENT
Start: 2022-01-14

## 2022-01-14 RX ORDER — OLOPATADINE HYDROCHLORIDE 1 MG/ML
1 SOLUTION/ DROPS OPHTHALMIC 2 TIMES DAILY PRN
Qty: 5 ML | Refills: 5 | Status: SHIPPED | OUTPATIENT
Start: 2022-01-14

## 2022-01-14 RX ORDER — AZELASTINE 1 MG/ML
2 SPRAY, METERED NASAL 2 TIMES DAILY PRN
Qty: 30 ML | Refills: 11 | Status: SHIPPED | OUTPATIENT
Start: 2022-01-14 | End: 2023-01-14

## 2022-01-14 NOTE — PROGRESS NOTES
ALLERGY & IMMUNOLOGY CLINIC - FOLLOW UP     HISTORY OF PRESENT ILLNESS     Patient ID: Silvia Newsome is a 51 y.o. female    CC: allergic rhinoconjunctivitis    HPI: Silvia Newsome is a 51 y.o. female following up for refills on her medications for allergic rhinoconjunctivitis.  She was last seen in this clinic by Dr. Davidson 8/16/19.    History is mostly from patient. She called  at end of the visit on speaker and he added to the history.    She is out of her flonase, azelastine, and olopatadine eye drops. She says she feels well now, but has been sick with sinus problems for the last 2 weeks. She says she gets swelling of her lymph nodes under her ears sometimes but it goes down. Happens about once every 6 weeks and lasts for 3-4 days. She endorses congestion, post nasal drip, itchy eyes. Sometimes uses zyrtec over the counter.    Vaccines:     There is no immunization history on file for this patient.     REVIEW OF SYSTEMS     CONST: no F/C/NS, no unexplained weight changes  EYES: + pruritus  NOSE: + congestion, + PND  PULM: no SOB, no wheezing, no cough  GI: no pain, no N/V/D  DERM: no rashes, no skin breaks     MEDICAL HISTORY     MedHx:   Patient Active Problem List   Diagnosis    Pneumonia of right middle lobe due to infectious organism    Seasonal allergic rhinitis due to pollen       SurgHx:   History reviewed. No pertinent surgical history.    Medications:   Current Outpatient Medications on File Prior to Visit   Medication Sig Dispense Refill    diphenhydrAMINE (SOMINEX) 25 mg tablet Take 1 tablet (25 mg total) by mouth nightly as needed for Insomnia.      fluticasone propionate (FLONASE) 50 mcg/actuation nasal spray 2 sprays (100 mcg total) by Each Nostril route once daily. 1 Bottle 11    augmented betamethasone dipropionate (DIPROLENE-AF) 0.05 % ointment Apply topically 2 (two) times daily. 45 g 2    azelastine (ASTELIN) 137 mcg (0.1 %) nasal spray 2 sprays (274 mcg total) by Nasal route  "2 (two) times daily as needed for Rhinitis. Donot snort (because it tastes bad) 30 mL 11    naproxen (NAPROSYN) 500 MG tablet       olopatadine (PATANOL) 0.1 % ophthalmic solution INSTILL 1 DROP IN BOTH EYES TWICE DAILY AS NEEDED (Patient not taking: Reported on 1/14/2022) 5 mL 5     No current facility-administered medications on file prior to visit.       Drug Allergies:   Review of patient's allergies indicates:   Allergen Reactions    Shellfish containing products       SocHx:   Social History     Tobacco Use    Smoking status: Never Smoker    Smokeless tobacco: Never Used   Substance Use Topics    Alcohol use: No    Drug use: Never        PHYSICAL EXAM     VS: Ht 4' 9" (1.448 m)   Wt 56.2 kg (123 lb 14.4 oz)   BMI 26.81 kg/m²   GENERAL: Alert, NAD, well-appearing, cooperative  EYES: EOMI, no conjunctival injection, no discharge, no infraorbital shiners  EARS: external auditory canals normal B/L, TM normal B/L  NOSE: NT 2-3+ B/L, no stringing mucous, no polyps visualized  ORAL: MMM, no ulcers, no thrush, no cobblestoning  NECK: no thyromegaly, no LAD  LUNGS: CTAB, no w/r/c, no increased WOB  HEART: RRR, normal S1/S2, no m/g/r  ABDOMEN: BS present, soft, non-tender, non-distended  EXTREMITIES: No LE edema  DERM: no rashes, no skin breaks  NEURO: normal speech, normal gait, no facial asymmetry     LABORATORY STUDIES     Component      Latest Ref Rng & Units 2/28/2017   WBC      3.90 - 12.70 K/uL 12.37   RBC      4.00 - 5.40 M/uL 4.50   Hemoglobin      12.0 - 16.0 g/dL 13.4   Hematocrit      37.0 - 48.5 % 39.2   MCV      82 - 98 fL 87   MCH      27.0 - 31.0 pg 29.8   MCHC      32.0 - 36.0 % 34.2   RDW      11.5 - 14.5 % 12.3   Platelets      150 - 350 K/uL 205   MPV      9.2 - 12.9 fL 9.9   Gran # (ANC)      1.8 - 7.7 K/uL 10.6 (H)   Lymph #      1.0 - 4.8 K/uL 0.8 (L)   Mono #      0.3 - 1.0 K/uL 0.9   Eos #      0.0 - 0.5 K/uL 0.0   Baso #      0.00 - 0.20 K/uL 0.01   Gran %      38.0 - 73.0 % 85.8 (H) "   Lymph %      18.0 - 48.0 % 6.8 (L)   Mono %      4.0 - 15.0 % 7.2   Eosinophil %      0.0 - 8.0 % 0.1   Basophil %      0.0 - 1.9 % 0.1   Differential Method       Automated   Sodium      136 - 145 mmol/L 136   Potassium      3.5 - 5.1 mmol/L 3.7   Chloride      95 - 110 mmol/L 104   CO2      23 - 29 mmol/L 22 (L)   Glucose      70 - 110 mg/dL 100   BUN      6 - 20 mg/dL 8   Creatinine      0.5 - 1.4 mg/dL 0.8   Calcium      8.7 - 10.5 mg/dL 9.1   PROTEIN TOTAL      6.0 - 8.4 g/dL 8.8 (H)   Albumin      3.5 - 5.2 g/dL 3.5   BILIRUBIN TOTAL      0.1 - 1.0 mg/dL 0.9   Alkaline Phosphatase      55 - 135 U/L 63   AST      10 - 40 U/L 20   ALT      10 - 44 U/L 9 (L)   Anion Gap      8 - 16 mmol/L 10   eGFR if African American      >60 mL/min/1.73 m:2 >60   eGFR if non African American      >60 mL/min/1.73 m:2 >60        ALLERGEN TESTING     Skin Prick:   Done by Dr. Rogel on 10/3/2011, was positive to:   Tree pollens: rafael, box elder, cottonwood, elm, hackberry, ligustrum, red maple   Grass pollens: bahia, martha, qasim   Weed pollens: lambs quarter, marsh elder, pigweed, plantain, russian thistle, sheep sorrel      IMAGING & OTHER DIAGNOSTICS     CXR 1 view 11/8/21:  FINDINGS:  The lungs are clear, with normal appearance of pulmonary vasculature and no pleural effusion or pneumothorax.  The cardiac silhouette is normal in size. The hilar and mediastinal contours are unremarkable.  Bones show no acute abnormalities.  There is mild scoliosis.  Impression:  No acute abnormality.     CHART REVIEW     Reviewed prior allergy notes from Dr. Mcdonald and Dr. Davidson. Reviewed prior labs, chest imaging.     ASSESSMENT & PLAN     Silvia Newsome is a 51 y.o. female with     # Allergic rhinoconjunctivitis: patient is out of her medications and feels they control her symptoms well  -resume flonase 2 SEN daily  -resume azelastine 2 SEN BID prn  -resume olopatadine eye drops BID prn  -continue prn zyrtec    # Recurrent  lymphadenopathy: Patient reports lymph nodes behind ears get swollen and tender about once every 6 weeks and then go down after about 3-4 days. Has been going on for about 2 years. Reassured patient that the fact that the lymph nodes do not remain enlarged lowers suspicion for anything concerning. Will check basic labs.  -cbc/diff ordered  -CMP ordered    Follow up: yearly or sooner if needed      Jorge George MD  Allergy/Immunology

## 2022-01-14 NOTE — TELEPHONE ENCOUNTER
Can not speak with patient's  as no involvement of care has been signed. Responding to patient directly via other message sent by her.

## 2022-01-14 NOTE — TELEPHONE ENCOUNTER
----- Message from Tsering Mcgregor sent at 1/14/2022  9:59 AM CST -----  Regarding: Virtual visit request  Name of Who is Calling: Albertina Newsome (spouse)           What is the request in detail: He is requesting a call back from staff in regards to scheduling a virtual visit with the provider for the patient as soon as possible. Please advise            Can the clinic reply by [facility]: no           [facility]: [phone]

## 2022-01-14 NOTE — TELEPHONE ENCOUNTER
----- Message from Jaron Ellison sent at 1/14/2022  7:39 AM CST -----  Contact: Patient  The pt called and would like to reschedule her appt for 8:40 as a virtual appt    Please call her back at [phone]

## 2022-01-14 NOTE — TELEPHONE ENCOUNTER
Spoke with the patient and scheduled her to see Dr. George today, 01/14, at 4PM in [facility].

## 2022-01-15 LAB
ALBUMIN SERPL BCP-MCNC: 3.5 G/DL (ref 3.5–5.2)
ALP SERPL-CCNC: 72 U/L (ref 55–135)
ALT SERPL W/O P-5'-P-CCNC: 17 U/L (ref 10–44)
ANION GAP SERPL CALC-SCNC: 9 MMOL/L (ref 8–16)
AST SERPL-CCNC: 21 U/L (ref 10–40)
BILIRUB SERPL-MCNC: 0.2 MG/DL (ref 0.1–1)
BUN SERPL-MCNC: 13 MG/DL (ref 6–20)
CALCIUM SERPL-MCNC: 9.3 MG/DL (ref 8.7–10.5)
CHLORIDE SERPL-SCNC: 105 MMOL/L (ref 95–110)
CO2 SERPL-SCNC: 25 MMOL/L (ref 23–29)
CREAT SERPL-MCNC: 1 MG/DL (ref 0.5–1.4)
EST. GFR  (AFRICAN AMERICAN): >60 ML/MIN/1.73 M^2
EST. GFR  (NON AFRICAN AMERICAN): >60 ML/MIN/1.73 M^2
GLUCOSE SERPL-MCNC: 88 MG/DL (ref 70–110)
POTASSIUM SERPL-SCNC: 3.9 MMOL/L (ref 3.5–5.1)
PROT SERPL-MCNC: 8.1 G/DL (ref 6–8.4)
SODIUM SERPL-SCNC: 139 MMOL/L (ref 136–145)

## 2022-01-18 ENCOUNTER — PATIENT MESSAGE (OUTPATIENT)
Dept: ALLERGY | Facility: CLINIC | Age: 52
End: 2022-01-18
Payer: OTHER GOVERNMENT

## 2023-03-25 ENCOUNTER — OFFICE VISIT (OUTPATIENT)
Dept: URGENT CARE | Facility: CLINIC | Age: 53
End: 2023-03-25
Payer: OTHER MISCELLANEOUS

## 2023-03-25 VITALS
OXYGEN SATURATION: 96 % | WEIGHT: 123 LBS | DIASTOLIC BLOOD PRESSURE: 65 MMHG | TEMPERATURE: 97 F | BODY MASS INDEX: 26.54 KG/M2 | SYSTOLIC BLOOD PRESSURE: 106 MMHG | HEART RATE: 86 BPM | HEIGHT: 57 IN | RESPIRATION RATE: 18 BRPM

## 2023-03-25 DIAGNOSIS — S89.91XA INJURY OF RIGHT KNEE, INITIAL ENCOUNTER: ICD-10-CM

## 2023-03-25 DIAGNOSIS — S09.92XA INJURY OF NOSE, INITIAL ENCOUNTER: ICD-10-CM
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
## 2023-03-25 DIAGNOSIS — Z02.6 ENCOUNTER RELATED TO WORKER'S COMPENSATION CLAIM: Primary | ICD-10-CM

## 2023-03-25 PROCEDURE — 70150 XR FACIAL BONES 3 OR MORE VIEW: ICD-10-PCS | Mod: S$GLB,,, | Performed by: RADIOLOGY

## 2023-03-25 PROCEDURE — 73562 X-RAY EXAM OF KNEE 3: CPT | Mod: RT,S$GLB,, | Performed by: RADIOLOGY

## 2023-03-25 PROCEDURE — 73562 XR KNEE 3 VIEW RIGHT: ICD-10-PCS | Mod: RT,S$GLB,, | Performed by: RADIOLOGY

## 2023-03-25 PROCEDURE — 99203 OFFICE O/P NEW LOW 30 MIN: CPT | Mod: S$GLB,,,

## 2023-03-25 PROCEDURE — 99203 PR OFFICE/OUTPT VISIT, NEW, LEVL III, 30-44 MIN: ICD-10-PCS | Mod: S$GLB,,,

## 2023-03-25 PROCEDURE — 70150 X-RAY EXAM OF FACIAL BONES: CPT | Mod: S$GLB,,, | Performed by: RADIOLOGY

## 2023-03-25 NOTE — PATIENT INSTRUCTIONS
- OTC tylenol or ibuprofen for pain  - Rest, ice, compression, elevation  - Follow up with occupational health on 3/27/23

## 2023-03-25 NOTE — LETTER
[facility] - [facility]gent Care  [address] [facility], [address]  [place] [address]  Phone: [phone]  Fax: [phone]  [facility]: [id number]    Pt Name: Silvia Newsome  Injury Date: 03/25/2023   Employee ID:  Date of First Treatment: 03/25/2023   Company: [employer] to [employer]       Appointment Time: 04:45 PM Arrived: 5:00 pm   Provider: Eloy Garcia PA-C Time Out: 6:13 PM     Office Treatment:   1. Encounter related to worker's compensation claim    2. Fall, initial encounter    3. Injury of nose, initial encounter    4. Injury of right knee, initial encounter                     Return Appointment: 3/27/2023 at 9:30 am  DIPTI

## 2023-03-25 NOTE — PROGRESS NOTES
Subjective:       Patient ID: Silvia Newsome is a 52 y.o. female.    Chief Complaint: Knee Injury (DOI: 03/25/2023 Facial pain and rt knee injury )    Patient's place of employment - [employer]  Patient's job title - As[employer]  Date of injury - 03/25/2023  Body part injured including left or right -  Rt knee and Facial pain  Injury Mechanism - fall   What they were doing when they got hurt - Patient fell and hit her face and injured her rt knee.  What they did immediately after - Told Supervisor  Pain scale right now -   Avni    Patient is a 52-year-old female who presents for evaluation of facial pain and right knee pain following a fall that occurred at work about 3 hours PTA.  States that she had fallen forward.  Was able to catch herself with her arms but still hit her face against the ground.  States that she is mainly having pain to her nose and her upper gums.  She applied ice with improvement in swelling.  Knee pain located inferior to right patella.  Worse with bending or twisting.  Denies any other symptoms including epistaxis, neck pain, numbness or tingling, nausea, vomiting, loss of consciousness.    [employer] interpretation services used ([employer] language, interpretor #[id number])    Knee Injury  This is a new problem. The current episode started today. The problem has been gradually worsening. Associated symptoms include arthralgias. Pertinent negatives include no abdominal pain, chest pain, fever, headaches, joint swelling, nausea, neck pain, numbness, rash or vomiting. The symptoms are aggravated by standing, twisting, walking and bending. She has tried nothing for the symptoms. The treatment provided no relief.     Constitution: Negative for fever.   HENT:  Positive for facial swelling and facial trauma. Negative for ear pain.    Neck: Negative for neck pain and neck stiffness.   Cardiovascular:  Negative for chest pain and passing out.   Eyes:  Negative for eye trauma and eye pain.   Respiratory:  Negative for  shortness of breath.    Gastrointestinal:  Negative for abdominal pain, nausea and vomiting.   Musculoskeletal:  Positive for joint pain. Negative for joint swelling.   Skin:  Negative for rash and abrasion.   Neurological:  Negative for dizziness, light-headedness, passing out, facial drooping, headaches, loss of consciousness, numbness and tingling.      Objective:      Physical Exam  Vitals and nursing note reviewed.   Constitutional:       General: She is not in acute distress.     Appearance: Normal appearance.   HENT:      Head: Normocephalic and atraumatic.      Right Ear: External ear normal.      Left Ear: External ear normal.      Nose:      Comments: Mild ttp to bridge of nose. No deformity. No epistaxis. No septal hematoma.     Mouth/Throat:      Mouth: Mucous membranes are moist.      Pharynx: Oropharynx is clear.      Comments: Mild TTP to upper gums. No dental pain. No intraoral lesions.  Eyes:      Extraocular Movements: Extraocular movements intact.      Conjunctiva/sclera: Conjunctivae normal.      Pupils: Pupils are equal, round, and reactive to light.   Cardiovascular:      Rate and Rhythm: Normal rate and regular rhythm.      Pulses: Normal pulses.      Heart sounds: Normal heart sounds.   Pulmonary:      Effort: Pulmonary effort is normal. No respiratory distress.      Breath sounds: Normal breath sounds.   Musculoskeletal:         General: Normal range of motion.      Cervical back: Normal range of motion and neck supple. No rigidity or tenderness.      Right knee: No ecchymosis. Normal range of motion. Tenderness (mild ttp over patellar tendon) present.      Left knee: Normal.   Skin:     General: Skin is warm and dry.      Capillary Refill: Capillary refill takes less than 2 seconds.   Neurological:      General: No focal deficit present.      Mental Status: She is alert.      Cranial Nerves: No cranial nerve deficit.       XR FACIAL BONES 3 OR MORE VIEW    Result Date:  3/25/2023  EXAMINATION: XR FACIAL BONES 3 OR MORE VIEW CLINICAL HISTORY: Unspecified injury of nose, initial encounter TECHNIQUE: Four views of the facial bones were performed. COMPARISON: None FINDINGS: There is no evidence of acute fracture or dislocation of the facial bones.  The bilateral nasal bones are intact.  The nasal septum is intact.  There is scattered dental amalgam.  The mandible appears intact.  The orbits appear symmetric and intact.  There is no evidence of a large air-fluid level in the paranasal sinuses.     No acute osseous abnormality Electronically signed by: Floyd Diallo Date:    03/25/2023 Time:    18:13    XR KNEE 3 VIEW RIGHT    Result Date: 3/25/2023  EXAMINATION: XR KNEE 3 VIEW RIGHT CLINICAL HISTORY: Unspecified injury of right lower leg, initial encounter TECHNIQUE: AP, lateral, and Merchant views of the right knee were performed. COMPARISON: None FINDINGS: There is no acute fracture or dislocation. Alignment is normal. Joint spaces are preserved. No joint effusion.     No acute osseous abnormality. Electronically signed by: Floyd Diallo Date:    03/25/2023 Time:    18:14     Assessment:       1. Encounter related to worker's compensation claim    2. Fall, initial encounter    3. Injury of nose, initial encounter    4. Injury of right knee, initial encounter          Plan:                   No follow-ups on file.        Patient Instructions   - OTC tylenol or ibuprofen for pain  - Rest, ice, compression, elevation  - Follow up with occupational health on 3/27/23

## 2023-03-27 ENCOUNTER — OFFICE VISIT (OUTPATIENT)
Dept: URGENT CARE | Facility: CLINIC | Age: 53
End: 2023-03-27
Payer: COMMERCIAL

## 2023-03-27 VITALS
OXYGEN SATURATION: 97 % | BODY MASS INDEX: 26.54 KG/M2 | SYSTOLIC BLOOD PRESSURE: 115 MMHG | RESPIRATION RATE: 18 BRPM | HEART RATE: 69 BPM | HEIGHT: 57 IN | DIASTOLIC BLOOD PRESSURE: 75 MMHG | WEIGHT: 123 LBS
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
## 2023-03-27 DIAGNOSIS — S09.92XA INJURY OF NOSE, INITIAL ENCOUNTER: ICD-10-CM

## 2023-03-27 DIAGNOSIS — S89.91XA INJURY OF RIGHT KNEE, INITIAL ENCOUNTER: ICD-10-CM

## 2023-03-27 PROCEDURE — 99203 OFFICE O/P NEW LOW 30 MIN: CPT | Mod: S$GLB,,, | Performed by: EMERGENCY MEDICINE

## 2023-03-27 PROCEDURE — 99203 PR OFFICE/OUTPT VISIT, NEW, LEVL III, 30-44 MIN: ICD-10-PCS | Mod: S$GLB,,, | Performed by: EMERGENCY MEDICINE

## 2023-03-27 NOTE — LETTER
[facility] - Urgent Care  [address] [facility], [address]  [place] [address]  Phone: [phone]  Fax: [phone]  [facility]: [id number]    Pt Name: Silvia Newsome  Injury Date: 03/25/2023   Employee ID:  Date of First Treatment: 03/27/2023   Company: [employer] to record [employer]       Appointment Time: 09:15 AM Arrived: 09:30 am   Provider: Yoni Casillas MD Time Out: 10:20 am     Office Treatment:   1. Fall, initial encounter    2. Injury of nose, initial encounter    3. Injury of right knee, initial encounter          Patient Instructions: Attention not to aggravate affected area (TAKE ALEVE TWICE DAILY)    Restrictions:  ([employer] LIGHT DUTY FORMS FILLED OUT UNTIL 3/30/2023)     Return Appointment: 3/30/2023 at 09:00 am  JAVIER

## 2023-03-27 NOTE — PROGRESS NOTES
Subjective:       Patient ID: Silvia Newsome is a 52 y.o. female.    Chief Complaint: Knee Injury (DOI: 03/25/2023 RT Knee/LM)    Patient's place of employment - [employer]  Patient's job title - Assoicate  Date of injury - 03/25/2023  Body part injured including left or right -  Rt knee and Facial pain  Injury Mechanism - fall   What they were doing when they got hurt - Patient fell and hit her face and injured her rt knee.  What they did immediately after - Told Supervisor  Pain scale right now - 8  LM     Is a 52-year-old female  at [employer] who tripped and fell landing on her right knee in face and bracing herself with her bilateral upper extremities.  She had x-rays performed of the right knee and facial bones which were negative.  She does feel improved however still having some pain on palpation of the right knee and right maxillary region of the face.  There are no loose teeth or lacerations.  She has full range of motion of the right knee as well as strength.  She has mild soreness of bilateral upper extremities.  Will allow her to work light duty and take ibuprofen or leave for pain and inflammation and return to work on Thursday for anticipated full clearance to work regular duty at that point.  Return to clinic 03/30/2023.  [employer] forms filled out.    Knee Injury  Associated symptoms include arthralgias. Pertinent negatives include no abdominal pain, chest pain, fever, headaches, joint swelling, nausea, neck pain, numbness, rash or vomiting.     ROS    Constitution: Negative for fever.   HENT:  Positive for facial swelling and facial trauma. Negative for ear pain.    Neck: Negative for neck pain and neck stiffness.   Cardiovascular:  Negative for chest pain and passing out.   Eyes:  Negative for eye trauma and eye pain.   Respiratory:  Negative for shortness of breath.    Gastrointestinal:  Negative for abdominal pain, nausea and vomiting.   Musculoskeletal:  Positive for joint pain. Negative for  joint swelling.   Skin:  Negative for rash and abrasion.   Neurological:  Negative for dizziness, light-headedness, passing out, facial drooping, headaches, loss of consciousness, numbness and tingling.      Objective:      Physical Exam  Vitals and nursing note reviewed.   Constitutional:       General: She is not in acute distress.     Appearance: Normal appearance.   HENT:      Head: Normocephalic and atraumatic.      Right Ear: External ear normal.      Left Ear: External ear normal.      Nose:      Comments: Mild ttp to bridge of nose. No deformity. No epistaxis. No septal hematoma.     Mouth/Throat:      Mouth: Mucous membranes are moist.      Pharynx: Oropharynx is clear.      Comments: Mild TTP to upper gums. No dental pain. No intraoral lesions.  Eyes:      Extraocular Movements: Extraocular movements intact.      Conjunctiva/sclera: Conjunctivae normal.      Pupils: Pupils are equal, round, and reactive to light.   Cardiovascular:      Rate and Rhythm: Normal rate and regular rhythm.      Pulses: Normal pulses.      Heart sounds: Normal heart sounds.   Pulmonary:      Effort: Pulmonary effort is normal. No respiratory distress.      Breath sounds: Normal breath sounds.   Musculoskeletal:         General: Normal range of motion.      Cervical back: Normal range of motion and neck supple. No rigidity or tenderness.      Right knee: No ecchymosis. Normal range of motion. Tenderness (mild ttp over patellar tendon) present.      Left knee: Normal.   Skin:     General: Skin is warm and dry.      Capillary Refill: Capillary refill takes less than 2 seconds.   Neurological:      General: No focal deficit present.      Mental Status: She is alert.      Cranial Nerves: No cranial nerve deficit.         XR FACIAL BONES 3 OR MORE VIEW    Result Date: 3/25/2023  EXAMINATION: XR FACIAL BONES 3 OR MORE VIEW CLINICAL HISTORY: Unspecified injury of nose, initial encounter TECHNIQUE: Four views of the facial bones were  performed. COMPARISON: None FINDINGS: There is no evidence of acute fracture or dislocation of the facial bones.  The bilateral nasal bones are intact.  The nasal septum is intact.  There is scattered dental amalgam.  The mandible appears intact.  The orbits appear symmetric and intact.  There is no evidence of a large air-fluid level in the paranasal sinuses.     No acute osseous abnormality Electronically signed by: Floyd Diallo Date:    03/25/2023 Time:    18:13    XR KNEE 3 VIEW RIGHT    Result Date: 3/25/2023  EXAMINATION: XR KNEE 3 VIEW RIGHT CLINICAL HISTORY: Unspecified injury of right lower leg, initial encounter TECHNIQUE: AP, lateral, and Merchant views of the right knee were performed. COMPARISON: None FINDINGS: There is no acute fracture or dislocation. Alignment is normal. Joint spaces are preserved. No joint effusion.     No acute osseous abnormality. Electronically signed by: Floyd Diallo Date:    03/25/2023 Time:    18:14       Assessment:       1. Fall, initial encounter    2. Injury of nose, initial encounter    3. Injury of right knee, initial encounter          Plan:       Is a 52-year-old female  at [employer] who tripped and fell landing on her right knee in face and bracing herself with her bilateral upper extremities.  She had x-rays performed of the right knee and facial bones which were negative.  She does feel improved however still having some pain on palpation of the right knee and right maxillary region of the face.  There are no loose teeth or lacerations.  She has full range of motion of the right knee as well as strength.  She has mild soreness of bilateral upper extremities.  Will allow her to work light duty and take ibuprofen or leave for pain and inflammation and return to work on Thursday for anticipated full clearance to work regular duty at that point.  Return to clinic 03/30/2023.  [employer] forms filled out.     Patient Instructions: Attention not to aggravate  affected area (TAKE ALEVE TWICE DAILY)   Restrictions:  ([employer] LIGHT DUTY FORMS FILLED OUT UNTIL 3/30/2023)  Follow up in about 3 days (around 3/30/2023).

## 2023-03-30 ENCOUNTER — OFFICE VISIT (OUTPATIENT)
Dept: URGENT CARE | Facility: CLINIC | Age: 53
End: 2023-03-30
Payer: COMMERCIAL

## 2023-03-30 DIAGNOSIS — S00.83XD CONTUSION OF FACE, SUBSEQUENT ENCOUNTER: ICD-10-CM

## 2023-03-30 DIAGNOSIS — S80.01XD CONTUSION OF RIGHT KNEE, SUBSEQUENT ENCOUNTER: Primary | ICD-10-CM
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
## 2023-03-30 PROCEDURE — 99214 PR OFFICE/OUTPT VISIT, EST, LEVL IV, 30-39 MIN: ICD-10-PCS | Mod: S$GLB,,, | Performed by: EMERGENCY MEDICINE

## 2023-03-30 PROCEDURE — 99214 OFFICE O/P EST MOD 30 MIN: CPT | Mod: S$GLB,,, | Performed by: EMERGENCY MEDICINE

## 2023-03-30 NOTE — LETTER
[facility] - Urgent Care  [address], [address]  [place] [address]  Phone: [phone]  Fax: [phone]  [facility]: [id number]    Pt Name: Silvia Newsome  Injury Date: 03/25/2023   Employee ID:  Date of First Treatment: 03/30/2023   Company: [employer] to record [employer]       Appointment Time: 08:45 AM Arrived: 09:00 am   Provider: Yoni Casillas MD Time Out: 09:45 am     Office Treatment:   1. Contusion of right knee, subsequent encounter    2. Contusion of face, subsequent encounter    3. Fall, subsequent encounter          Patient Instructions: Attention not to aggravate affected area    Restrictions: Regular Duty, Discharged from Occupational Health (RETUEN [employer] DUTY TOMORROW 3/31/2023)     Return Appointment: None.  Follow up if symptoms worsen or fail to improve.   JAVIER

## 2023-03-30 NOTE — PROGRESS NOTES
Subjective:      Patient ID: Silvia Newsome is a 52 y.o. female.    Chief Complaint: Knee Injury (DOI: 03/25/2023 RT knee & Facial Pain/LM)    Patient's place of employment - [employer]  Patient's job title - Associate   Date of Injury - 03/25/2023  Body part injured - RT Knee & Facial Pain  Current work status per last visit - Light Duty  Improved, same, or worse - improved  Pain Scale right now (1-10) -  0  LM    PATIENT IS VERY MUCH IMPROVED WITHOUT ANY SIGNIFICANT PAIN HAS NORMAL SQUAT AND GAIT.  WILL ALLOW TO WORK REGULAR DUTY STARTING TOMORROW AND MAY RETURN P.R.N..  HAS BEEN TAKING OVER-THE-COUNTER MEDICATION AND OVER THE LAST 2 DAYS ICE TO SORE AREAS.  SHE KNOWS THAT SHE MAY RETURN P.R.N..  IT HAS BEEN 5 DAYS SINCE THE INJURY IN HER SYMPTOMS RESOLVE.    Knee Injury  Associated symptoms include arthralgias. Pertinent negatives include no abdominal pain, chest pain, fever, headaches, joint swelling, nausea, neck pain, numbness, rash or vomiting.     ROS    Constitution: Negative for fever.   HENT:  Positive for facial swelling and facial trauma. Negative for ear pain.    Neck: Negative for neck pain and neck stiffness.   Cardiovascular:  Negative for chest pain and passing out.   Eyes:  Negative for eye trauma and eye pain.   Respiratory:  Negative for shortness of breath.    Gastrointestinal:  Negative for abdominal pain, nausea and vomiting.   Musculoskeletal:  Positive for joint pain. Negative for joint swelling.   Skin:  Negative for rash and abrasion.   Neurological:  Negative for dizziness, light-headedness, passing out, facial drooping, headaches, loss of consciousness, numbness and tingling.   Objective:     Physical Exam  Vitals and nursing note reviewed.   Constitutional:       General: She is not in acute distress.     Appearance: Normal appearance.   HENT:      Head: Normocephalic and atraumatic.      Right Ear: External ear normal.      Left Ear: External ear normal.      Nose:      Comments: Mild  ttp to bridge of nose. No deformity. No epistaxis. No septal hematoma.     Mouth/Throat:      Mouth: Mucous membranes are moist.      Pharynx: Oropharynx is clear.      Comments: Mild TTP to upper gums. No dental pain. No intraoral lesions.  Eyes:      Extraocular Movements: Extraocular movements intact.      Conjunctiva/sclera: Conjunctivae normal.      Pupils: Pupils are equal, round, and reactive to light.   Cardiovascular:      Rate and Rhythm: Normal rate and regular rhythm.      Pulses: Normal pulses.      Heart sounds: Normal heart sounds.   Pulmonary:      Effort: Pulmonary effort is normal. No respiratory distress.      Breath sounds: Normal breath sounds.   Musculoskeletal:         General: Normal range of motion.      Cervical back: Normal range of motion and neck supple. No rigidity or tenderness.      Right knee: No ecchymosis. Normal range of motion. Tenderness (mild ttp over patellar tendon) present.      Left knee: Normal.   Skin:     General: Skin is warm and dry.      Capillary Refill: Capillary refill takes less than 2 seconds.   Neurological:      General: No focal deficit present.      Mental Status: She is alert.      Cranial Nerves: No cranial nerve deficit.      XR FACIAL BONES 3 OR MORE VIEW    Result Date: 3/25/2023  EXAMINATION: XR FACIAL BONES 3 OR MORE VIEW CLINICAL HISTORY: Unspecified injury of nose, initial encounter TECHNIQUE: Four views of the facial bones were performed. COMPARISON: None FINDINGS: There is no evidence of acute fracture or dislocation of the facial bones.  The bilateral nasal bones are intact.  The nasal septum is intact.  There is scattered dental amalgam.  The mandible appears intact.  The orbits appear symmetric and intact.  There is no evidence of a large air-fluid level in the paranasal sinuses.     No acute osseous abnormality Electronically signed by: Floyd Diallo Date:    03/25/2023 Time:    18:13    XR KNEE 3 VIEW RIGHT    Result Date:  3/25/2023  EXAMINATION: XR KNEE 3 VIEW RIGHT CLINICAL HISTORY: Unspecified injury of right lower leg, initial encounter TECHNIQUE: AP, lateral, and Merchant views of the right knee were performed. COMPARISON: None FINDINGS: There is no acute fracture or dislocation. Alignment is normal. Joint spaces are preserved. No joint effusion.     No acute osseous abnormality. Electronically signed by: Floyd Diallo Date:    03/25/2023 Time:    18:14       Assessment:      1. Contusion of right knee, subsequent encounter    2. Contusion of face, subsequent encounter    3. Fall, subsequent encounter      Plan:     PATIENT IS VERY MUCH IMPROVED WITHOUT ANY SIGNIFICANT PAIN HAS NORMAL SQUAT AND GAIT.  WILL ALLOW TO WORK REGULAR DUTY STARTING TOMORROW AND MAY RETURN P.R.N..  HAS BEEN TAKING OVER-THE-COUNTER MEDICATION AND OVER THE LAST 2 DAYS ICE TO SORE AREAS.  SHE KNOWS THAT SHE MAY RETURN P.R.N..  IT HAS BEEN 5 DAYS SINCE THE INJURY IN HER SYMPTOMS RESOLVE.       Patient Instructions: Attention not to aggravate affected area   Restrictions: Regular Duty, Discharged from Occupational Health ([facility] [place] DUTY TOMORROW 3/31/2023)  Follow up if symptoms worsen or fail to improve.

## 2025-02-05 ENCOUNTER — HOSPITAL ENCOUNTER (EMERGENCY)
Facility: HOSPITAL | Age: 55
Discharge: HOME | End: 2025-02-05
Attending: EMERGENCY MEDICINE
Payer: OTHER GOVERNMENT

## 2025-02-05 VITALS
HEIGHT: 59 IN | OXYGEN SATURATION: 99 % | HEART RATE: 91 BPM | DIASTOLIC BLOOD PRESSURE: 65 MMHG | WEIGHT: 123 LBS | SYSTOLIC BLOOD PRESSURE: 110 MMHG | BODY MASS INDEX: 24.8 KG/M2 | TEMPERATURE: 98 F | RESPIRATION RATE: 16 BRPM

## 2025-02-05 DIAGNOSIS — E86.0 DEHYDRATION: ICD-10-CM

## 2025-02-05 DIAGNOSIS — R42 DIZZINESS: ICD-10-CM

## 2025-02-05 DIAGNOSIS — J06.9 VIRAL URI: Primary | ICD-10-CM

## 2025-02-05 LAB
B-HCG UR QL: NEGATIVE
BACTERIA #/AREA URNS HPF: ABNORMAL /HPF
BILIRUB UR QL STRIP: NEGATIVE
CLARITY UR: ABNORMAL
COLOR UR: YELLOW
CTP QC/QA: YES
CTP QC/QA: YES
GLUCOSE SERPL-MCNC: 95 MG/DL (ref 70–110)
GLUCOSE UR QL STRIP: NEGATIVE
HGB UR QL STRIP: ABNORMAL
HYALINE CASTS #/AREA URNS LPF: 4 /LPF
KETONES UR QL STRIP: ABNORMAL
LEUKOCYTE ESTERASE UR QL STRIP: ABNORMAL
MICROSCOPIC COMMENT: ABNORMAL
NITRITE UR QL STRIP: NEGATIVE
PH UR STRIP: 6 [PH] (ref 5–8)
POC MOLECULAR INFLUENZA A AGN: NEGATIVE
POC MOLECULAR INFLUENZA B AGN: NEGATIVE
PROT UR QL STRIP: ABNORMAL
RBC #/AREA URNS HPF: 3 /HPF (ref 0–4)
SP GR UR STRIP: 1.02 (ref 1–1.03)
SQUAMOUS #/AREA URNS HPF: 11 /HPF
URN SPEC COLLECT METH UR: ABNORMAL
UROBILINOGEN UR STRIP-ACNC: NEGATIVE EU/DL
WBC #/AREA URNS HPF: 3 /HPF (ref 0–5)

## 2025-02-05 PROCEDURE — 87502 INFLUENZA DNA AMP PROBE: CPT

## 2025-02-05 PROCEDURE — 25000003 PHARM REV CODE 250

## 2025-02-05 PROCEDURE — 63600175 PHARM REV CODE 636 W HCPCS

## 2025-02-05 PROCEDURE — 81000 URINALYSIS NONAUTO W/SCOPE: CPT

## 2025-02-05 PROCEDURE — 93010 ELECTROCARDIOGRAM REPORT: CPT | Mod: ,,, | Performed by: INTERNAL MEDICINE

## 2025-02-05 PROCEDURE — 93005 ELECTROCARDIOGRAM TRACING: CPT

## 2025-02-05 PROCEDURE — 81025 URINE PREGNANCY TEST: CPT

## 2025-02-05 PROCEDURE — 82962 GLUCOSE BLOOD TEST: CPT

## 2025-02-05 PROCEDURE — 99284 EMERGENCY DEPT VISIT MOD MDM: CPT | Mod: 25

## 2025-02-05 PROCEDURE — 96360 HYDRATION IV INFUSION INIT: CPT

## 2025-02-05 RX ORDER — ACETAMINOPHEN 500 MG
1000 TABLET ORAL
Status: COMPLETED | OUTPATIENT
Start: 2025-02-05 | End: 2025-02-05

## 2025-02-05 RX ORDER — IBUPROFEN 800 MG/1
800 TABLET ORAL 3 TIMES DAILY
Qty: 15 TABLET | Refills: 0 | Status: SHIPPED | OUTPATIENT
Start: 2025-02-05 | End: 2025-02-10

## 2025-02-05 RX ORDER — LORATADINE 10 MG/1
10 TABLET ORAL DAILY
Qty: 30 TABLET | Refills: 0 | Status: SHIPPED | OUTPATIENT
Start: 2025-02-05 | End: 2025-03-07

## 2025-02-05 RX ORDER — BENZONATATE 100 MG/1
100 CAPSULE ORAL 3 TIMES DAILY PRN
Qty: 15 CAPSULE | Refills: 0 | Status: SHIPPED | OUTPATIENT
Start: 2025-02-05 | End: 2025-02-10

## 2025-02-05 RX ORDER — GUAIFENESIN 100 MG/5ML
100-200 SOLUTION ORAL EVERY 4 HOURS PRN
Qty: 60 ML | Refills: 0 | Status: SHIPPED | OUTPATIENT
Start: 2025-02-05 | End: 2025-02-15

## 2025-02-05 RX ORDER — ACETAMINOPHEN 500 MG
1000 TABLET ORAL EVERY 8 HOURS PRN
Qty: 15 TABLET | Refills: 0 | Status: SHIPPED | OUTPATIENT
Start: 2025-02-05 | End: 2025-02-10

## 2025-02-05 RX ADMIN — ACETAMINOPHEN 1000 MG: 500 TABLET ORAL at 02:02

## 2025-02-05 RX ADMIN — SODIUM CHLORIDE, POTASSIUM CHLORIDE, SODIUM LACTATE AND CALCIUM CHLORIDE 1000 ML: 600; 310; 30; 20 INJECTION, SOLUTION INTRAVENOUS at 01:02

## 2025-02-05 NOTE — DISCHARGE INSTRUCTIONS
Thank you for coming to our Emergency Department today. It is important to remember that some problems or medical conditions are difficult to diagnose and may not be found or addressed during your Emergency Department visit.  These conditions often start with non-specific symptoms and can only be diagnosed on follow up visits with your primary care physician or specialist when the symptoms continue or change. Please remember that all medical conditions can change, and we cannot predict how you will be feeling tomorrow or the next day. Return to the ER with any questions/concerns, new/concerning symptoms, worsening or failure to improve.       Be sure to follow up with your primary care doctor and review all labs/imaging/tests that were performed during your ER visit with them. It is very common for us to identify non-emergent incidental findings which must be followed up with your primary care physician.  Some labs/imaging/tests may be outside of the normal range, and require non-emergent follow-up and/or further investigation/treatment/procedures/testing to help diagnose/exclude/prevent complications or other potentially serious medical conditions. Some abnormalities may not have been discussed or addressed during your ER visit.     An ER visit does not replace a primary care visit, and many screening tests or follow-up tests cannot be ordered by an ER doctor or performed by the ER. Some tests may even require pre-approval.    If you do not have a primary care doctor, you may contact the one listed on your discharge paperwork or you may also call the [facility] Appointment Desk at [phone] , or [address] [place] [address] at  [phone] to schedule an appointment, or establish care with a primary care doctor or even a specialist and to obtain information about local resources. It is important to your health that you have a primary care doctor.    Please take all medications as directed. We have done our best to select  a medication for you that will treat your condition however, all medications may potentially have side-effects and it is impossible to predict which medications may give you side-effects or what those side-effects (if any) those medications may give you.  If you feel that you are having a negative effect or side-effect of any medication you should stop taking those medications immediately and seek medical attention. If you feel that you are having a life-threatening reaction call 911.        Do not drive, swim, climb to height, take a bath, operate heavy machinery, drink alcohol or take potentially sedating medications, sign any legal documents or make any important decisions for 24 hours if you have received any pain medications, sedatives or mood altering drugs during your ER visit or within 24 hours of taking them if they have been prescribed to you.     You can find additional resources for Dentists, hearing aids, durable medical equipment, low cost pharmacies and other resources at [email or web address]

## 2025-02-05 NOTE — ED TRIAGE NOTES
Pt presents to ed with cc of dizziness, pt also describes cold symptoms that has been on going for last 20 days.

## 2025-02-06 NOTE — ED PROVIDER NOTES
Encounter Date: 2/5/2025       History     Chief Complaint   Patient presents with    Headache     Pt arrived in ED, c/o dizziness, HA, and n/v x 3 weeks. Pt denies any CP, SOB, abd pain, or diarrhea.     Nausea    Dizziness     Patient is a 54-year-old female with no past medical history who presents to the emergency department with complaints of cough, nasal congestion, headache and dizziness times 4 days.  Patient reports a fever at home T-max 101°.  She initially was taking Tylenol, but states that it was not improving her symptoms so she has stopped.  Patient states that the dizziness is worse when she stands up.  She states that she has not been drinking a lot of fluids secondary to appetite.  She denies chest pain, shortness of breath, weakness, numbness, tingling.        Review of patient's allergies indicates:   Allergen Reactions    Shellfish containing products      History reviewed. No pertinent past medical history.  History reviewed. No pertinent surgical history.  Family History   Problem Relation Name Age of Onset    Breast cancer Neg Hx      Colon cancer Neg Hx      Ovarian cancer Neg Hx       Social History     Tobacco Use    Smoking status: Never    Smokeless tobacco: Never   Substance Use Topics    Alcohol use: No    Drug use: Never     Review of Systems   Constitutional:  Negative for chills and fever.   HENT:  Positive for congestion.    Respiratory:  Positive for cough. Negative for chest tightness and shortness of breath.    Cardiovascular:  Negative for chest pain and leg swelling.   Gastrointestinal:  Negative for abdominal pain and nausea.   Neurological:  Positive for dizziness and headaches. Negative for weakness.       Physical Exam     Initial Vitals [02/05/25 1242]   BP Pulse Resp Temp SpO2   114/72 105 16 98 °F (36.7 °C) 99 %      MAP       --         Physical Exam    Nursing note and vitals reviewed.  Constitutional: She appears well-developed and well-nourished. She is not  diaphoretic. No distress.   HENT:   Head: Normocephalic and atraumatic.   Right Ear: External ear normal. Tympanic membrane is not erythematous.   Left Ear: External ear normal. Tympanic membrane is not erythematous. Mouth/Throat: Oropharynx is clear and moist. No oropharyngeal exudate.   Uvula midline   Eyes: Conjunctivae and EOM are normal. Pupils are equal, round, and reactive to light. Right eye exhibits no discharge. Left eye exhibits no discharge.   Neck: Neck supple. No tracheal deviation present. No JVD present.   Normal range of motion.  Cardiovascular:  Normal rate.           Pulmonary/Chest: No stridor. No respiratory distress. She has no wheezes. She has no rhonchi. She has no rales.   Abdominal: She exhibits no distension. There is no abdominal tenderness. There is no rebound and no guarding.   Musculoskeletal:      Cervical back: Normal range of motion and neck supple.     Neurological: She is alert and oriented to person, place, and time. She has normal strength. No cranial nerve deficit or sensory deficit.   Patient ambulating without difficulty  Negative Romberg  No ataxia on exam   Skin: No rash noted. No erythema.   Psychiatric: She has a normal mood and affect.         ED Course   Procedures  Labs Reviewed   URINALYSIS, REFLEX TO URINE CULTURE - Abnormal       Result Value    Specimen UA Urine, Clean Catch      Color, UA Yellow      Appearance, UA Hazy (*)     pH, UA 6.0      Specific Gravity, UA 1.020      Protein, UA 1+ (*)     Glucose, UA Negative      Ketones, UA 1+ (*)     Bilirubin (UA) Negative      Occult Blood UA Trace (*)     Nitrite, UA Negative      Urobilinogen, UA Negative      Leukocytes, UA 2+ (*)     Narrative:     Specimen Source->Urine   URINALYSIS MICROSCOPIC - Abnormal    RBC, UA 3      WBC, UA 3      Bacteria Moderate (*)     Squam Epithel, UA 11      Hyaline Casts, UA 4 (*)     Microscopic Comment SEE COMMENT      Narrative:     Specimen Source->Urine   POCT URINE PREGNANCY     POC Preg Test, Ur Negative       Acceptable Yes     POCT INFLUENZA A/B MOLECULAR    POC Molecular Influenza A Ag Negative      POC Molecular Influenza B Ag Negative       Acceptable Yes     POCT GLUCOSE MONITORING CONTINUOUS    POC Glucose 95       EKG Readings: (Independently Interpreted)   Initial Reading: No STEMI. Previous EKG: Compared with most recent EKG Rhythm: Normal Sinus Rhythm. Heart Rate: 74. Conduction: Normal. Axis: Right Axis Deviation.     ECG Results              EKG 12-lead (Final result)  Result time 02/06/25 10:47:20      Final result by Unknown User (02/06/25 10:47:20)                                      Imaging Results    None          Medications   lactated ringers bolus 1,000 mL (0 mLs Intravenous Stopped 2/5/25 1441)   acetaminophen tablet 1,000 mg (1,000 mg Oral Given 2/5/25 1441)     Medical Decision Making  Patient is a well-appearing 54 y.o. female. Tolerating PO, non-toxic appearing. The patient remained comfortable and stable during their visit in the ED.  Details of ED course documented in ED workup.     Differential Diagnosis is considered, but is not limited to: COVID, Flu, Strep throat, Viral URI, PTA, RPA, pneumonia, acute otitis media, otitis externa, mastoiditis, sinusitis, viral gastroenteritis, measles, roseola.  Also considered but less likely:  PTA/RPA: no hot potato voice, no uvular deviation, no pain with passive neck flexion.  Esophageal rupture: No history of dysphagia.  Unlikely deep space infection/Tristin's, no submandibular or sublingual erythema or swelling.  Low suspicion for CNS infection/meningitis: no pain with passive neck flexion, no neck rigidity/stiffness, no neck tenderness, negative Brudzinski.  Unlikely EBV as no splenomegaly, no LUQ abdominal tenderness or pain.    COVID test Negative. and Influenza test Negative.    All historical, clinical, and laboratory findings reviewed. Patient with constellation of symptoms most  consistent with viral URI. There are no concerning features on physical exam to suggest an emergent or life threatening condition or an invasive bacterial infection, including, but not limited to the conditions noted above. No further intervention is indicated at this time. The patient is at low risk for an emergent/life threatening medical condition at this time, and I am of the belief that that it is safe to discharge the patient from the emergency department.     Patient/family instructed to follow up with PCP/Pediatrician in 1-2 days for recheck of today's complaints. Patient/family has been counseled regarding the need for follow-up as well as the indications to return to the emergency room should new, worsening, continued or worrisome developments. Discharge and follow-up instructions discussed with the patient/family who expressed understanding and willingness to comply with recommendations. Patient discharged from the emergency department in stable condition, in no acute distress.  I discussed with the patient/family the diagnosis, treatment plan, indications for return to the emergency department, and for expected follow-up. The patient/family verbalized an understanding. The patient/family is asked if there are any questions or concerns. We discuss the case, until all issues are addressed to the patient/family's satisfaction. Patient/family understands and is agreeable to the plan.       Amount and/or Complexity of Data Reviewed  Labs: ordered.    Risk  OTC drugs.  Prescription drug management.                                      Clinical Impression:  Final diagnoses:  [R42] Dizziness  [J06.9] Viral URI (Primary)  [E86.0] Dehydration          ED Disposition Condition    Discharge Stable          ED Prescriptions       Medication Sig Dispense Start Date End Date Auth. Provider    benzonatate (TESSALON) 100 MG capsule Take 1 capsule (100 mg total) by mouth 3 (three) times daily as needed. 15 capsule  2/5/2025 2/10/2025 Elodia Jha PA-C    loratadine (CLARITIN) 10 mg tablet Take 1 tablet (10 mg total) by mouth once daily. 30 tablet 2/5/2025 3/7/2025 Elodia Jha PA-C    guaiFENesin 100 mg/5 ml (ROBITUSSIN) 100 mg/5 mL syrup Take 5-10 mLs (100-200 mg total) by mouth every 4 (four) hours as needed for Cough. 60 mL 2/5/2025 2/15/2025 Elodia Jha PA-C    ibuprofen (ADVIL,MOTRIN) 800 MG tablet Take 1 tablet (800 mg total) by mouth 3 (three) times daily. for 5 days 15 tablet 2/5/2025 2/10/2025 Elodia Jha PA-C    acetaminophen (TYLENOL) 500 MG tablet Take 2 tablets (1,000 mg total) by mouth every 8 (eight) hours as needed for Pain. 15 tablet 2/5/2025 2/10/2025 Elodia Jha PA-C          Follow-up Information       Follow up With Specialties Details Why Contact Info    [facility] Emergency Dept Emergency Medicine Go to  for new or worsening symptoms [address] [facility] [place] [address] [phone]    [facility] -  Call in 3 days As needed [address] [place] [address]  [phone]               Elodia Jha PA-C  02/06/25 2895

## 2025-02-09 LAB
OHS QRS DURATION: 72 MS
OHS QTC CALCULATION: 427 MS
POCT GLUCOSE: 95 MG/DL (ref 70–110)

## 2025-02-13 ENCOUNTER — HOSPITAL ENCOUNTER (EMERGENCY)
Facility: HOSPITAL | Age: 55
Discharge: HOME | End: 2025-02-13
Attending: EMERGENCY MEDICINE
Payer: OTHER GOVERNMENT

## 2025-02-13 VITALS
BODY MASS INDEX: 21.81 KG/M2 | WEIGHT: 108 LBS | OXYGEN SATURATION: 98 % | DIASTOLIC BLOOD PRESSURE: 69 MMHG | SYSTOLIC BLOOD PRESSURE: 112 MMHG | HEART RATE: 98 BPM | RESPIRATION RATE: 16 BRPM | TEMPERATURE: 98 F

## 2025-02-13 DIAGNOSIS — J06.9 VIRAL URI WITH COUGH: Primary | ICD-10-CM

## 2025-02-13 LAB
BILIRUB UR QL STRIP: NEGATIVE
CLARITY UR: CLEAR
COLOR UR: COLORLESS
CTP QC/QA: YES
CTP QC/QA: YES
GLUCOSE UR QL STRIP: NEGATIVE
HGB UR QL STRIP: NEGATIVE
KETONES UR QL STRIP: NEGATIVE
LEUKOCYTE ESTERASE UR QL STRIP: NEGATIVE
NITRITE UR QL STRIP: NEGATIVE
PH UR STRIP: 7 [PH] (ref 5–8)
POC MOLECULAR INFLUENZA A AGN: NEGATIVE
POC MOLECULAR INFLUENZA B AGN: NEGATIVE
PROT UR QL STRIP: NEGATIVE
SARS-COV-2 RDRP RESP QL NAA+PROBE: NEGATIVE
SP GR UR STRIP: <1.005 (ref 1–1.03)
URN SPEC COLLECT METH UR: ABNORMAL
UROBILINOGEN UR STRIP-ACNC: NEGATIVE EU/DL

## 2025-02-13 PROCEDURE — 99284 EMERGENCY DEPT VISIT MOD MDM: CPT | Mod: 25

## 2025-02-13 PROCEDURE — 96372 THER/PROPH/DIAG INJ SC/IM: CPT

## 2025-02-13 PROCEDURE — 87502 INFLUENZA DNA AMP PROBE: CPT

## 2025-02-13 PROCEDURE — 63600175 PHARM REV CODE 636 W HCPCS

## 2025-02-13 PROCEDURE — 87635 SARS-COV-2 COVID-19 AMP PRB: CPT

## 2025-02-13 PROCEDURE — 81003 URINALYSIS AUTO W/O SCOPE: CPT

## 2025-02-13 RX ORDER — BENZONATATE 200 MG/1
200 CAPSULE ORAL 3 TIMES DAILY PRN
Qty: 30 CAPSULE | Refills: 0 | Status: SHIPPED | OUTPATIENT
Start: 2025-02-13 | End: 2025-02-23

## 2025-02-13 RX ORDER — DEXAMETHASONE SODIUM PHOSPHATE 4 MG/ML
8 INJECTION, SOLUTION INTRA-ARTICULAR; INTRALESIONAL; INTRAMUSCULAR; INTRAVENOUS; SOFT TISSUE
Status: COMPLETED | OUTPATIENT
Start: 2025-02-13 | End: 2025-02-13

## 2025-02-13 RX ORDER — ACETAMINOPHEN 500 MG
500 TABLET ORAL EVERY 6 HOURS PRN
Qty: 30 TABLET | Refills: 0 | Status: SHIPPED | OUTPATIENT
Start: 2025-02-13

## 2025-02-13 RX ADMIN — DEXAMETHASONE SODIUM PHOSPHATE 8 MG: 4 INJECTION INTRA-ARTICULAR; INTRALESIONAL; INTRAMUSCULAR; INTRAVENOUS; SOFT TISSUE at 06:02

## 2025-02-13 NOTE — Clinical Note
"Silvia Guerreroia" Jerod was seen and treated in our emergency department on 2/13/2025.  She may return to work on 02/17/2025.       If you have any questions or concerns, please don't hesitate to call.      Olamide Guadalupe PA-C"

## 2025-02-13 NOTE — ED NOTES
Pt presents w/  for c/o body aches, generalized weakness, poor apetite, loss of taste, scratchy throat x 3 wks.  Pt ran 100.5 fever last pm.   Denies nasal congestion, cough, cp or sob.  P is AAOx3, resp even and unlabored, skin warm and dry. NAD noted.

## 2025-02-13 NOTE — ED PROVIDER NOTES
Encounter Date: 2/13/2025    SCRIBE #1 NOTE: IAnat, am scribing for, and in the presence of,  Olamide Guadalupe PA-C.       History     Chief Complaint   Patient presents with    URI     Pt complaining of unrelieved uri after taking prescribed meds. Pt also reports bilat leg pain and fever on last night.      Silvia Newsome is a 54 y.o. female, with a PMHx of HLD, chronic allergic conjunctivitis, who presents to the ED with URI symptoms x20 days. Patient reports body aches, headache, bilateral ear pain, a dry mild cough- which has been improving, sore throat, rhinorrhea and a fever (T-max 100F). States she wakes up with her left eye very puffy daily. She reports attempted Tx with ibuprofen, tylenol and benadryl with temporary relief. She reports she saw her allergist for these symptoms who recommended weekly injections x6 months but she has not started them yet. Denies any recent injuries or falls. No other exacerbating or alleviating factors. Denies dysuria, hematuria, CP, SOB, nausea, vomiting, diarrhea or other associated symptoms.     Per chart reiew, pt was seen in the ED on 2/5/25 with similar complaints, Dx with viral URI, dehydration and dizziness, and discharged home with Rx tessalon, claritin, robitussin, tylenol and motrin.     The history is provided by the patient and medical records. No  was used.     Review of patient's allergies indicates:   Allergen Reactions    Shellfish containing products      Past Medical History:   Diagnosis Date    High cholesterol      History reviewed. No pertinent surgical history.  Family History   Problem Relation Name Age of Onset    Breast cancer Neg Hx      Colon cancer Neg Hx      Ovarian cancer Neg Hx       Social History     Tobacco Use    Smoking status: Never    Smokeless tobacco: Never   Substance Use Topics    Alcohol use: No    Drug use: Never     Review of Systems   Constitutional:  Positive for fever.   HENT:  Positive for ear  pain, rhinorrhea and sore throat.    Eyes:         (+) left periorbital swelling   Respiratory:  Positive for cough. Negative for shortness of breath.    Cardiovascular:  Negative for chest pain.   Genitourinary:  Negative for dysuria and hematuria.   Musculoskeletal:  Positive for myalgias.   Neurological:  Positive for headaches.       Physical Exam     Initial Vitals [02/13/25 1700]   BP Pulse Resp Temp SpO2   112/69 98 16 97.9 °F (36.6 °C) 98 %      MAP       --         Physical Exam    Nursing note and vitals reviewed.  Constitutional: She appears well-developed and well-nourished. She is not diaphoretic. No distress.   HENT:   Head: Normocephalic and atraumatic.   Right Ear: Tympanic membrane and external ear normal.   Left Ear: Tympanic membrane and external ear normal.   Nose: Nose normal. Right sinus exhibits no maxillary sinus tenderness and no frontal sinus tenderness. Left sinus exhibits no maxillary sinus tenderness and no frontal sinus tenderness. Mouth/Throat: Oropharynx is clear and moist. No oropharyngeal exudate, posterior oropharyngeal edema or posterior oropharyngeal erythema.   Eyes: Conjunctivae and EOM are normal. Right eye exhibits no discharge. Left eye exhibits no discharge.   Neck: No tracheal deviation present. No JVD present.   Normal range of motion.  Cardiovascular:  Normal rate, regular rhythm and normal heart sounds.           Pulmonary/Chest: Breath sounds normal. No stridor. No respiratory distress. She has no wheezes. She has no rhonchi. She has no rales.   Abdominal: Abdomen is soft. She exhibits no distension. There is no abdominal tenderness.   Musculoskeletal:      Cervical back: Normal range of motion.     Neurological: She is alert and oriented to person, place, and time.   Skin: Skin is warm and dry. Capillary refill takes less than 2 seconds. No rash noted. No erythema.   Psychiatric: She has a normal mood and affect.         ED Course   Procedures  Labs Reviewed    URINALYSIS, REFLEX TO URINE CULTURE - Abnormal       Result Value    Specimen UA Urine, Clean Catch      Color, UA Colorless (*)     Appearance, UA Clear      pH, UA 7.0      Specific Gravity, UA <1.005 (*)     Protein, UA Negative      Glucose, UA Negative      Ketones, UA Negative      Bilirubin (UA) Negative      Occult Blood UA Negative      Nitrite, UA Negative      Urobilinogen, UA Negative      Leukocytes, UA Negative      Narrative:     Specimen Source->Urine   POCT INFLUENZA A/B MOLECULAR    POC Molecular Influenza A Ag Negative      POC Molecular Influenza B Ag Negative       Acceptable Yes     SARS-COV-2 RDRP GENE    POC Rapid COVID Negative       Acceptable Yes            Imaging Results              X-Ray Chest PA And Lateral (Final result)  Result time 02/13/25 18:28:50      Final result by Eloy Forte MD (02/13/25 18:28:50)                   Impression:      Stable and negative chest.      Electronically signed by: Eloy Forte  Date:    02/13/2025  Time:    18:28               Narrative:    EXAMINATION:  XR CHEST PA AND LATERAL    CLINICAL HISTORY:  Other specified cough    TECHNIQUE:  PA and lateral views of the chest were performed.    COMPARISON:  11/08/2021    FINDINGS:  The heart is not enlarged the trachea is midline.  The lungs and pleural spaces are clear.  The bony structures are intact.                                       Medications   dexAMETHasone injection 8 mg (8 mg Intramuscular Given 2/13/25 1843)     Medical Decision Making  Silvia Newsome is a 54 y.o. female, with a PMHx of HLD, chronic allergic conjunctivitis, who presents to the ED with URI symptoms x20 days.    Differential includes but not limited to viral URI, COVID, influenza, pneumonia, sinusitis, bronchitis.    Patient is alert and afebrile.  Patient is nontoxic appearing, not in distress.  Vitals within normal limits.  On physical exam patient ambulating without difficulty.   Lungs are clear to auscultation.  Patient is speaking in full sentences without difficulty.  Normal heart rate and rhythm.  No posterior oropharyngeal erythema, tonsillar swelling, tonsillar exudates.  Uvula is midline.  Tympanic membrane within normal limits bilaterally.  Abdomen is soft and nondistended.  No abdominal tenderness rebound or guarding.  Negative McBurney's or Gibson's sign.  No signs of fluid overload.  No leg edema.  Strong distal radial pulse bilaterally.    Influenza and COVID are negative.  Urinalysis negative for infection.  Chest x-ray independently interpreted and negative for acute abnormalities such as pneumonia or pneumothorax.    Discussed with patient's symptoms most likely due to a viral URI. Shared decision making with patient/patient family medications decided for a steroid injection here today.  Recommended patient taking Tylenol or ibuprofen as directed for fever or pain.  Recommended follow up with PCP in 2 days.  Discussed symptomatic treatment for cough and congestion.  Strict return precautions given for new or worsening symptoms.  Patient is in agreeance to this plan, expresses understanding return precautions and follow up plan.  Patient is stable for discharge at this time.    Amount and/or Complexity of Data Reviewed  External Data Reviewed: notes.     Details: See HPI  Labs: ordered. Decision-making details documented in ED Course.  Radiology: ordered. Decision-making details documented in ED Course.    Risk  OTC drugs.  Prescription drug management.            Scribe Attestation:   Scribe #1: I performed the above scribed service and the documentation accurately describes the services I performed. I attest to the accuracy of the note.                         I, Olamide Guadalupe PA-C, personally performed the services described in this documentation. All medical record entries made by the scribe were at my direction and in my presence. I have reviewed the chart and agree that  the record reflects my personal performance and is accurate and complete.      DISCLAIMER: This note was prepared with [employer] voice recognition transcription software. Garbled syntax, mangled pronouns, and other bizarre constructions may be attributed to that software system.        Clinical Impression:  Final diagnoses:  [J06.9] Viral URI with cough (Primary)          ED Disposition Condition    Discharge Stable          ED Prescriptions       Medication Sig Dispense Start Date End Date Auth. Provider    acetaminophen (TYLENOL) 500 MG tablet Take 1 tablet (500 mg total) by mouth every 6 (six) hours as needed for Pain. 30 tablet 2/13/2025 -- Olamide Guadalupe PA-C    benzocaine-menthoL 15-3.6 mg Lozg 1 lozenge by Mucous Membrane route 4 (four) times daily as needed (sore throat). 16 lozenge 2/13/2025 -- Olamide Guadalupe PA-C    benzonatate (TESSALON) 200 MG capsule Take 1 capsule (200 mg total) by mouth 3 (three) times daily as needed for Cough. 30 capsule 2/13/2025 2/23/2025 Olamide Guadalupe PA-C          Follow-up Information       Follow up With Specialties Details Why Contact Info    [facility] - Emergency Dept Emergency Medicine Go to  If new symptoms develop or symptoms worsen [address] [facility] [place] [address] [phone]    Your Primary Care Provider  Schedule an appointment as soon as possible for a visit in 2 days For follow up              Olamide Guadalupe PA-C  02/13/25 [id number]

## 2025-02-14 NOTE — DISCHARGE INSTRUCTIONS
Thank you for coming to our Emergency Department today. It is important to remember that some problems or medical conditions are difficult to diagnose and may not be found or addressed during your Emergency Department visit.  These conditions often start with non-specific symptoms and can only be diagnosed on follow up visits with your primary care physician or specialist when the symptoms continue or change. Please remember that all medical conditions can change, and we cannot predict how you will be feeling tomorrow or the next day. Return to the ER with any questions/concerns, new/concerning symptoms, worsening or failure to improve.       Be sure to follow up with your primary care doctor and review all labs/imaging/tests that were performed during your ER visit with them. It is very common for us to identify non-emergent incidental findings which must be followed up with your primary care physician.  Some labs/imaging/tests may be outside of the normal range, and require non-emergent follow-up and/or further investigation/treatment/procedures/testing to help diagnose/exclude/prevent complications or other potentially serious medical conditions. Some abnormalities may not have been discussed or addressed during your ER visit.     An ER visit does not replace a primary care visit, and many screening tests or follow-up tests cannot be ordered by an ER doctor or performed by the ER. Some tests may even require pre-approval.    If you do not have a primary care doctor, you may contact the one listed on your discharge paperwork or you may also call the [facility] Appointment Desk at [phone] , or [address] [place] [address] at  [phone] to schedule an appointment, or establish care with a primary care doctor or even a specialist and to obtain information about local resources. It is important to your health that you have a primary care doctor.    Please take all medications as directed. We have done our best to select  a medication for you that will treat your condition however, all medications may potentially have side-effects and it is impossible to predict which medications may give you side-effects or what those side-effects (if any) those medications may give you.  If you feel that you are having a negative effect or side-effect of any medication you should stop taking those medications immediately and seek medical attention. If you feel that you are having a life-threatening reaction call 911.        Do not drive, swim, climb to height, take a bath, operate heavy machinery, drink alcohol or take potentially sedating medications, sign any legal documents or make any important decisions for 24 hours if you have received any pain medications, sedatives or mood altering drugs during your ER visit or within 24 hours of taking them if they have been prescribed to you.     You can find additional resources for Dentists, hearing aids, durable medical equipment, low cost pharmacies and other resources at [email or web address]

## 2025-06-23 ENCOUNTER — HOSPITAL ENCOUNTER (EMERGENCY)
Facility: HOSPITAL | Age: 55
Discharge: HOME | End: 2025-06-23
Attending: EMERGENCY MEDICINE
Payer: OTHER GOVERNMENT

## 2025-06-23 VITALS
HEART RATE: 65 BPM | HEIGHT: 59 IN | DIASTOLIC BLOOD PRESSURE: 71 MMHG | RESPIRATION RATE: 18 BRPM | BODY MASS INDEX: 22.38 KG/M2 | SYSTOLIC BLOOD PRESSURE: 123 MMHG | WEIGHT: 111 LBS | OXYGEN SATURATION: 99 % | TEMPERATURE: 98 F

## 2025-06-23 DIAGNOSIS — R51.9 ACUTE NONINTRACTABLE HEADACHE, UNSPECIFIED HEADACHE TYPE: ICD-10-CM
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
## 2025-06-23 DIAGNOSIS — S16.1XXA STRAIN OF NECK MUSCLE, INITIAL ENCOUNTER: ICD-10-CM

## 2025-06-23 PROCEDURE — 99284 EMERGENCY DEPT VISIT MOD MDM: CPT | Mod: 25

## 2025-06-23 PROCEDURE — 25000003 PHARM REV CODE 250: Performed by: PHYSICIAN ASSISTANT

## 2025-06-23 RX ORDER — IBUPROFEN 600 MG/1
600 TABLET, FILM COATED ORAL EVERY 6 HOURS PRN
Qty: 20 TABLET | Refills: 0 | Status: SHIPPED | OUTPATIENT
Start: 2025-06-23 | End: 2025-06-28

## 2025-06-23 RX ORDER — METHOCARBAMOL 500 MG/1
1000 TABLET, FILM COATED ORAL
Status: COMPLETED | OUTPATIENT
Start: 2025-06-23 | End: 2025-06-23

## 2025-06-23 RX ORDER — METHOCARBAMOL 750 MG/1
750 TABLET, FILM COATED ORAL 3 TIMES DAILY PRN
Qty: 20 TABLET | Refills: 0 | Status: SHIPPED | OUTPATIENT
Start: 2025-06-23

## 2025-06-23 RX ORDER — ACETAMINOPHEN 500 MG
500 TABLET ORAL
Status: COMPLETED | OUTPATIENT
Start: 2025-06-23 | End: 2025-06-23

## 2025-06-23 RX ORDER — LIDOCAINE 50 MG/G
1 PATCH TOPICAL DAILY
Qty: 15 PATCH | Refills: 0 | Status: SHIPPED | OUTPATIENT
Start: 2025-06-23 | End: 2025-07-08

## 2025-06-23 RX ORDER — ACETAMINOPHEN 500 MG
500 TABLET ORAL EVERY 4 HOURS PRN
Qty: 20 TABLET | Refills: 0 | Status: SHIPPED | OUTPATIENT
Start: 2025-06-23 | End: 2025-06-28

## 2025-06-23 RX ADMIN — METHOCARBAMOL 1000 MG: 500 TABLET ORAL at 11:06

## 2025-06-23 RX ADMIN — ACETAMINOPHEN 500 MG: 500 TABLET ORAL at 11:06
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
## 2025-06-23 NOTE — DISCHARGE INSTRUCTIONS

## 2025-06-23 NOTE — Clinical Note
"Silvia Guerreroia" Jerod was seen and treated in our emergency department on 6/23/2025.  She may return to work on 06/26/2025.       If you have any questions or concerns, please don't hesitate to call.      Shikha Bo PA-C"